# Patient Record
Sex: FEMALE | Race: WHITE
[De-identification: names, ages, dates, MRNs, and addresses within clinical notes are randomized per-mention and may not be internally consistent; named-entity substitution may affect disease eponyms.]

---

## 2017-01-04 ENCOUNTER — HOSPITAL ENCOUNTER (OUTPATIENT)
Dept: HOSPITAL 45 - C.CTS | Age: 67
Discharge: HOME | End: 2017-01-04
Attending: PSYCHIATRY & NEUROLOGY
Payer: COMMERCIAL
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
## 2017-01-04 DIAGNOSIS — R51: Primary | ICD-10-CM

## 2017-01-04 DIAGNOSIS — S09.90XA: ICD-10-CM

## 2017-01-04 LAB
ALBUMIN/GLOB SERPL: 1.1 {RATIO} (ref 0.9–2)
ALP SERPL-CCNC: 73 U/L (ref 45–117)
ALT SERPL-CCNC: 18 U/L (ref 12–78)
ANION GAP SERPL CALC-SCNC: 9 MMOL/L (ref 3–11)
AST SERPL-CCNC: 14 U/L (ref 15–37)
BASOPHILS # BLD: 0.09 K/UL (ref 0–0.2)
BASOPHILS NFR BLD: 1 %
BUN SERPL-MCNC: 14 MG/DL (ref 7–18)
BUN/CREAT SERPL: 14.8 (ref 10–20)
CALCIUM SERPL-MCNC: 9.1 MG/DL (ref 8.5–10.1)
CHLORIDE SERPL-SCNC: 106 MMOL/L (ref 98–107)
CO2 SERPL-SCNC: 26 MMOL/L (ref 21–32)
COMPLETE: YES
CREAT SERPL-MCNC: 0.93 MG/DL (ref 0.6–1.2)
EOSINOPHIL NFR BLD AUTO: 301 K/UL (ref 130–400)
GLOBULIN SER-MCNC: 3.5 GM/DL (ref 2.5–4)
GLUCOSE SERPL-MCNC: 76 MG/DL (ref 70–99)
HCT VFR BLD CALC: 41.7 % (ref 37–47)
IG%: 0.1 %
IMM GRANULOCYTES NFR BLD AUTO: 27 %
LYMPHOCYTES # BLD: 2.45 K/UL (ref 1.2–3.4)
MCH RBC QN AUTO: 29.7 PG (ref 25–34)
MCHC RBC AUTO-ENTMCNC: 32.9 G/DL (ref 32–36)
MCV RBC AUTO: 90.3 FL (ref 80–100)
MONOCYTES NFR BLD: 6.4 %
NEUTROPHILS # BLD AUTO: 2 %
NEUTROPHILS NFR BLD AUTO: 63.5 %
PMV BLD AUTO: 11.6 FL (ref 7.4–10.4)
POTASSIUM SERPL-SCNC: 4.1 MMOL/L (ref 3.5–5.1)
RBC # BLD AUTO: 4.62 M/UL (ref 4.2–5.4)
SODIUM SERPL-SCNC: 141 MMOL/L (ref 136–145)
TSH SERPL-ACNC: 1.48 UIU/ML (ref 0.3–4.5)
WBC # BLD AUTO: 9.07 K/UL (ref 4.8–10.8)

## 2017-01-04 NOTE — DIAGNOSTIC IMAGING REPORT
CT HEAD WITHOUT CONTRAST (CT)



CLINICAL HISTORY: Headache. Head trauma. Dizziness.    



COMPARISON STUDY:  MRI the brain dated 7/6/2016



TECHNIQUE:  Axial CT of the brain is performed from the vertex to the skull

base. IV contrast was not administered for this examination.



CT DOSE: 614.27 mGy.cm



FINDINGS:



No intra or extra-axial mass lesions are visualized. There is no CT evidence of

acute cortical infarction. There is no evidence of midline shift. There is no

acute  hemorrhage. No calvarial fractures are visualized. 

There are patchy white matter hypodensities likely on a small vessel basis.

There is no evidence of pathologic ventricular dilatation.

There is no evidence of acute sinusitis



IMPRESSION: No acute intracranial findings







Electronically signed by:  Jone Recinos M.D.

1/4/2017 11:00 AM

## 2017-03-31 ENCOUNTER — HOSPITAL ENCOUNTER (OUTPATIENT)
Dept: HOSPITAL 45 - C.MRIBC | Age: 67
Discharge: HOME | End: 2017-03-31
Attending: PHYSICIAN ASSISTANT
Payer: COMMERCIAL

## 2017-03-31 DIAGNOSIS — M99.73: ICD-10-CM

## 2017-03-31 DIAGNOSIS — Z98.1: ICD-10-CM

## 2017-03-31 DIAGNOSIS — M54.16: Primary | ICD-10-CM

## 2017-03-31 NOTE — DIAGNOSTIC IMAGING REPORT
LUMBAR SPINE MRI



HISTORY: Pain. Radiculopathy.  LUMBAR RADICULOPATHY



TECHNIQUE: Multiplanar multisequence MRI of the lumbar spine was performed

without the use of contrast.



COMPARISON:  None.



FINDINGS: For the purpose of the report the L5-S1 disc space will be located on

axial image 27 of 30.

There are findings of a posterior laminectomy and fusion from L3 through L5.

Interpedicular screws are present.



There is moderate reactive bone marrow edema of the L2-L3 level. This appears to

be on a degenerative basis.



L1-L2: No significant central canal or neural foraminal narrowing.



L2-L3: Moderate multifactorial narrowing of the spinal canal. Moderate

broad-based disc herniation. Hypertrophic changes of posterior elements.

Moderate narrowing of the left and to lesser extent right neural foramina.



L3-L4: Findings of a posterior laminectomy and fusion. No major compromise of

the spinal canal.



L4-L5: Posterior laminectomy and fusion. No compromise of the spinal canal. Mild

broad-based disc bulge showing only minimal impact anterior thecal sac. Mild

narrowing of the neuroforamina bilaterally.



L5-S1: Posterior laminectomy and fusion. Mild central disc bulge showing no

significant impact her deformity with the thecal sac.



IMPRESSION:  



1. Moderate multifactorial spinal stenosis at L2-L3.

2. Posterior laminectomy and fusion from L3 through L5.

3. Considerable degenerative disc change L2-L3 







Electronically signed by:  Tay Vasquez M.D.

3/31/2017 2:35 PM



Dictated Date/Time:  3/31/2017 2:29 PM

## 2017-07-07 ENCOUNTER — HOSPITAL ENCOUNTER (OUTPATIENT)
Dept: HOSPITAL 45 - C.LABBFT | Age: 67
Discharge: HOME | End: 2017-07-07
Attending: PHYSICIAN ASSISTANT
Payer: COMMERCIAL

## 2017-07-07 DIAGNOSIS — R27.0: ICD-10-CM

## 2017-07-07 DIAGNOSIS — Z51.81: Primary | ICD-10-CM

## 2017-07-07 DIAGNOSIS — Z79.899: ICD-10-CM

## 2017-07-07 LAB
ALBUMIN/GLOB SERPL: 1.2 {RATIO} (ref 0.9–2)
ALP SERPL-CCNC: 68 U/L (ref 45–117)
ALT SERPL-CCNC: 17 U/L (ref 12–78)
ANION GAP SERPL CALC-SCNC: 6 MMOL/L (ref 3–11)
AST SERPL-CCNC: 13 U/L (ref 15–37)
BASOPHILS # BLD: 0.04 K/UL (ref 0–0.2)
BASOPHILS NFR BLD: 0.7 %
BUN SERPL-MCNC: 10 MG/DL (ref 7–18)
BUN/CREAT SERPL: 10.6 (ref 10–20)
CALCIUM SERPL-MCNC: 9.3 MG/DL (ref 8.5–10.1)
CHLORIDE SERPL-SCNC: 111 MMOL/L (ref 98–107)
CHOLEST/HDLC SERPL: 3.1 {RATIO}
CO2 SERPL-SCNC: 25 MMOL/L (ref 21–32)
COMPLETE: YES
CREAT SERPL-MCNC: 0.91 MG/DL (ref 0.6–1.2)
EOSINOPHIL NFR BLD AUTO: 219 K/UL (ref 130–400)
GLOBULIN SER-MCNC: 3.1 GM/DL (ref 2.5–4)
GLUCOSE SERPL-MCNC: 81 MG/DL (ref 70–99)
GLUCOSE UR QL: 75 MG/DL
HCT VFR BLD CALC: 41.6 % (ref 37–47)
IG%: 0 %
IMM GRANULOCYTES NFR BLD AUTO: 30.9 %
KETONES UR QL STRIP: 140 MG/DL
LYME DISEASE AB IGG: (no result)
LYME DISEASE AB IGM: (no result)
LYMPHOCYTES # BLD: 1.77 K/UL (ref 1.2–3.4)
MCH RBC QN AUTO: 30.9 PG (ref 25–34)
MCHC RBC AUTO-ENTMCNC: 33.2 G/DL (ref 32–36)
MCV RBC AUTO: 93.3 FL (ref 80–100)
MONOCYTES NFR BLD: 7.5 %
NEUTROPHILS # BLD AUTO: 1.7 %
NEUTROPHILS NFR BLD AUTO: 59.2 %
NITRITE UR QL STRIP: 74 MG/DL (ref 0–150)
PH UR: 230 MG/DL (ref 0–200)
PMV BLD AUTO: 12.6 FL (ref 7.4–10.4)
POTASSIUM SERPL-SCNC: 4.2 MMOL/L (ref 3.5–5.1)
RBC # BLD AUTO: 4.46 M/UL (ref 4.2–5.4)
SODIUM SERPL-SCNC: 142 MMOL/L (ref 136–145)
TSH SERPL-ACNC: 0.44 UIU/ML (ref 0.3–4.5)
VERY LOW DENSITY LIPOPROT CALC: 15 MG/DL
WBC # BLD AUTO: 5.72 K/UL (ref 4.8–10.8)

## 2017-07-11 ENCOUNTER — HOSPITAL ENCOUNTER (OUTPATIENT)
Dept: HOSPITAL 45 - C.MRIBC | Age: 67
Discharge: HOME | End: 2017-07-11
Attending: PHYSICIAN ASSISTANT
Payer: COMMERCIAL

## 2017-07-11 DIAGNOSIS — R27.0: ICD-10-CM

## 2017-07-11 DIAGNOSIS — R42: Primary | ICD-10-CM

## 2017-07-11 NOTE — DIAGNOSTIC IMAGING REPORT
BRAIN COMBO FOR IAC



HISTORY:66 [id number] Ataxia 



COMPARISON: Head CT 1/4/2017, 4/23/2012.



TECHNIQUE: Multiplanar multisequence MRI of the brain was obtained both with and

without the use of 5 mL Gadavist utilizing internal auditory canal protocol.



FINDINGS: 

Increased signal within the atria of the lateral ventricles bilaterally on the

diffusion sequence is not to be artifactual in nature. No restricted diffusion

within the brain parenchyma to suggest acute ischemia. No areas of decreased

signal on the ADC map. The midline structures including the corpus callosum,

brainstem, optic chiasm, pituitary gland and pineal gland appear unremarkable on

the sagittal T1 series. No cerebellar tonsillar herniation. There is mild

uncovertebral spurring of the imaged cervical spine.



Scattered areas of T2 prolongation are seen within the periventricular and

subcortical white matter of the cerebral hemispheres bilaterally. There is mild

cerebral atrophy. No acute intracranial hemorrhage, midline shift, abnormal

extra-axial collections or intracranial mass. The flow voids at the level of the

skull base appear patent. Orbits are symmetric. The mastoid air cells and middle

ear cavities are clear. There is mild ethmoid sinus disease.



The courses of the 7th and 8th cranial nerves appear normal without abnormal

enhancement. There is no cerebellar pontine angle mass identified.



IMPRESSION: 

1. No acute intracranial abnormality.

2. Course of the 7th and 8th cranial nerves appear normal without abnormal

enhancement or mass.

3. Mild cerebral atrophy with findings suggesting chronic microvascular ischemic

changes. 







The above report was generated using voice recognition software. It may contain

grammatical, syntax or spelling errors.







Electronically signed by:  Nasir Bean M.D.

7/11/2017 2:51 PM



Dictated Date/Time:  7/11/2017 2:36 PM

## 2017-10-02 ENCOUNTER — HOSPITAL ENCOUNTER (OUTPATIENT)
Dept: HOSPITAL 45 - X.SURG | Age: 67
Discharge: HOME | End: 2017-10-02
Attending: OPHTHALMOLOGY
Payer: COMMERCIAL

## 2017-10-02 VITALS
BODY MASS INDEX: 22.62 KG/M2 | WEIGHT: 115.24 LBS | WEIGHT: 115.24 LBS | BODY MASS INDEX: 22.62 KG/M2 | HEIGHT: 60 IN | HEIGHT: 60 IN

## 2017-10-02 VITALS — TEMPERATURE: 97.16 F

## 2017-10-02 VITALS — DIASTOLIC BLOOD PRESSURE: 73 MMHG | HEART RATE: 59 BPM | SYSTOLIC BLOOD PRESSURE: 121 MMHG | OXYGEN SATURATION: 94 %

## 2017-10-02 DIAGNOSIS — F31.9: ICD-10-CM

## 2017-10-02 DIAGNOSIS — Z88.1: ICD-10-CM

## 2017-10-02 DIAGNOSIS — F41.9: ICD-10-CM

## 2017-10-02 DIAGNOSIS — H26.9: Primary | ICD-10-CM

## 2017-10-02 DIAGNOSIS — G47.33: ICD-10-CM

## 2017-10-02 DIAGNOSIS — Z96.641: ICD-10-CM

## 2017-10-02 RX ADMIN — TROPICAMIDE SCH DROP: 10 SOLUTION/ DROPS OPHTHALMIC at 09:48

## 2017-10-02 RX ADMIN — KETOROLAC TROMETHAMINE SCH DROP: 5 SOLUTION OPHTHALMIC at 09:50

## 2017-10-02 RX ADMIN — PHENYLEPHRINE HYDROCHLORIDE SCH DROP: 25 SOLUTION/ DROPS OPHTHALMIC at 09:41

## 2017-10-02 RX ADMIN — MOXIFLOXACIN HYDROCHLORIDE SCH DROP: 5 SOLUTION/ DROPS OPHTHALMIC at 09:59

## 2017-10-02 RX ADMIN — PHENYLEPHRINE HYDROCHLORIDE SCH DROP: 25 SOLUTION/ DROPS OPHTHALMIC at 09:46

## 2017-10-02 RX ADMIN — MOXIFLOXACIN HYDROCHLORIDE SCH DROP: 5 SOLUTION/ DROPS OPHTHALMIC at 09:45

## 2017-10-02 RX ADMIN — TROPICAMIDE SCH DROP: 10 SOLUTION/ DROPS OPHTHALMIC at 09:42

## 2017-10-02 RX ADMIN — CYCLOPENTOLATE HYDROCHLORIDE SCH DROP: 10 SOLUTION/ DROPS OPHTHALMIC at 09:49

## 2017-10-02 RX ADMIN — KETOROLAC TROMETHAMINE SCH DROP: 5 SOLUTION OPHTHALMIC at 09:44

## 2017-10-02 RX ADMIN — CYCLOPENTOLATE HYDROCHLORIDE SCH DROP: 10 SOLUTION/ DROPS OPHTHALMIC at 09:43

## 2017-10-02 NOTE — [facility] OPERATIVE REPORT
Operative Report


Operative Date


Oct 2, 2017.





Pre-Operative Diagnosis





Right Eye Cataract





Post-Operative Diagnosis





Same





Procedure(s) Performed





Right Cataract Phacoemulsification With Intraocular Lens Implant





Surgeon


Dr Goldstein





Assistant Surgeon(s)


None





Estimated Blood Loss


0ml





Findings


cataract right eye





Fluids (cc crystalloids)


see anesthesia record





Specimens





None





Drains


none





Anesthesia


local with sedation





Complication(s)


None





Disposition


Recovery Room / PACU





Implants


mx60 21.5





Indications


decreased vision right eye





Description of Procedure


After informed consent was obtained in the holding area the patient was


wheeled back to the operating room where cardiac monitoring leads and oxygen


by nasal cannula was administered by Anesthesia. Gentle IV sedation was


given, and the patient's right eye was prepped and draped in usual sterile


fashion. A wire lid speculum was placed into the right eye and the operating


microscope was swung into position. Using 0.12 forceps and a Supersharp blade


a paracentesis port was made 2 o'clock hours away from the 9 o'clock position


of the patient's right eye. 1% non-preserved Lidocaine was then injected into


the anterior chamber for anesthesia.  A 2.0 mm keratotome blade was then used


to make a shelved clear corneal incision at the 9 o'clock position of the


right eye. Amvisc was injected into the anterior chamber and a cystotome and


Utrata forceps were used to perform a curvilinear capsulorrhexis. BSS on a


hydrodissection cannula was used to hydrodissect the lens nucleus away from


the capsular bag. The phacoemulsification handpiece was then used in a stop


and chop fashion to remove the lens nucleus. The irrigation and aspiration


handpiece was then used to remove the residual cortical material. Amvisc was


injected into the capsular bag and anterior chamber and a [employer] MX60


21.5 Diopter intraocular lens was injected into the capsular bag.  Irrigation


and aspiration handpiece was used to remove the residual viscoelastic


material. The wounds were hydrated and noted to be watertight.  The wire lid


speculum was removed from the eye.  Vigamox, Brimonidine, and TobraDex


ointment were placed on the eye and it was shielded.  It should be noted that


EndoCoat was used extensively during the case to protect the cornea endothelium.


 


DISPOSITION:  The patient tolerated the procedure well and was wheeled to the


post anesthesia care unit in stable condition.


I attest to the content of the Intraoperative Record and any orders documented 

therein.  Any exceptions are noted below.


I attest to the content of the Intraoperative Record and any orders documented 

therein.  Any exceptions are noted below.

## 2017-10-02 NOTE — ANESTHESIA PROGRESS NT - [facility]
Anesthesia Post Op Note


Date & Time


Oct 2, 2017 at 10:59





Vital Signs


Pain Intensity:  0





Vital Signs Past 12 Hours








  Date Time  Temp Pulse Resp B/P (MAP) Pulse Ox O2 Delivery O2 Flow Rate FiO2


 


10/2/17 10:34 36.2 49 16 129/74 (92) 95 Room Air  


 


10/2/17 09:30 36.6 53 16 138/86 (103) 96 Room Air  











Notes


Mental Status:  alert / awake / arousable, participated in evaluation


Pt Amnestic to Procedure:  Yes


Nausea / Vomiting:  adequately controlled


Pain:  adequately controlled


Airway Patency, RR, SpO2:  stable & adequate


BP & HR:  stable & adequate


Hydration State:  stable & adequate


Anesthetic Complications:  no major complications apparent

## 2017-10-02 NOTE — DISCHARGE INSTRUCTIONS-[facility]
Discharge Instructions


Date of Service


Oct 2, 2017.





Visit


Reason for Visit:  Right Cataract





Discharge


Discharge Diagnosis / Problem:  cataract right eye





Discharge Goals


Goal(s):  Improve function





Activity Recommendations


Activity Limitations:  per Instructions/Follow-up section


Lifting Limitations:  no more than 5 pounds





Anesthesia


.





Post Anesthesia Instructions:





If you have had General Anesthesia or IV Sedation:





*  Do not drive today.


*  Resume driving when surgeon permits.


*  Do not make important decisions or sign legal documents today.


*  Call surgeon for:





   1.  Temperature elevations greater than 101 degrees F.


   2.  Uncontrollable pain.


   3.  Excessive bleeding.


   4.  Persistent nausea and vomiting.


   5.  Medication intolerance (nausea, vomiting or rash).





*  For nausea and vomiting use only clear liquids such as: tea, soda, bouillon 

until nausea subsides, then gradually increase diet as tolerated.





*  If you have any concerns or questions, call your surgeon's office.  If 

physician is unavailable and it is an emergency, call 911 or go to the nearest 

emergency room.





.





Instructions / Follow-Up


Instructions / Follow-Up





ACTIVITY RECOMMENDATIONS:





*  Light activities





*  You may walk outside, read, watch television.





*  Mild irritation and blurred vision are common for the first few days, 

redness around the white part of the eye is common.








MEDICATIONS:





Resume previous medications unless instructed otherwise by your surgeon.





Eye drops (today and tomorrow):


   


   Cipro - one drop in operative eye every 2 hours while awake


   Prednisolone 1% - one drop in operative eye every 2 hours while awake


   Bromfenac - one drop in operative eye once daily


   





SPECIAL CARE INSTRUCTIONS:





*  If any problems or concerns, please call Dr. Goldstein's office at [phone].





*  Keep plastic shield taped over eye to sleep at night.





*  Keep plastic shield taped over eye except to administer eye drops.





*  Keep plastic shield on until office visit the following day.








FOLLOW UP VISIT:





Follow-up with Dr. Goldstein in the [place] office as scheduled.





If not already scheduled, please call the office at [phone].





Diet Recommendations


Home Diet:  resume previous diet





Procedures


Procedures Performed:  


Right Cataract Phacoemulsification With Intraocular Lens Implant





Pending Studies


Studies pending at discharge:  no





Medical Emergencies








.


Who to Call and When:





Medical Emergencies:  If at any time you feel your situation is an emergency, 

please call 911 immediately.





.





Non-Emergent Contact


Non-Emergency issues call your:  Ophthalmologist





.


.








"Provider Documentation" section prepared by Lui Goldstein.








.

## 2017-12-01 ENCOUNTER — HOSPITAL ENCOUNTER (EMERGENCY)
Dept: HOSPITAL 45 - C.EDB | Age: 67
Discharge: HOME | End: 2017-12-01
Payer: COMMERCIAL

## 2017-12-01 VITALS — TEMPERATURE: 97.7 F

## 2017-12-01 VITALS — SYSTOLIC BLOOD PRESSURE: 116 MMHG | DIASTOLIC BLOOD PRESSURE: 92 MMHG | OXYGEN SATURATION: 95 % | HEART RATE: 51 BPM

## 2017-12-01 VITALS
HEIGHT: 60 IN | BODY MASS INDEX: 23.37 KG/M2 | BODY MASS INDEX: 23.37 KG/M2 | WEIGHT: 119.05 LBS | HEIGHT: 60 IN | WEIGHT: 119.05 LBS

## 2017-12-01 DIAGNOSIS — F07.81: ICD-10-CM

## 2017-12-01 DIAGNOSIS — I10: ICD-10-CM

## 2017-12-01 DIAGNOSIS — F32.9: ICD-10-CM

## 2017-12-01 DIAGNOSIS — Z83.3: ICD-10-CM

## 2017-12-01 DIAGNOSIS — Z79.82: ICD-10-CM

## 2017-12-01 DIAGNOSIS — S42.261A: Primary | ICD-10-CM

## 2017-12-01 DIAGNOSIS — Z79.899: ICD-10-CM
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
## 2017-12-01 DIAGNOSIS — M81.0: ICD-10-CM

## 2017-12-01 DIAGNOSIS — Z90.710: ICD-10-CM

## 2017-12-01 DIAGNOSIS — Z80.3: ICD-10-CM

## 2017-12-01 DIAGNOSIS — E03.9: ICD-10-CM

## 2017-12-01 DIAGNOSIS — F17.210: ICD-10-CM

## 2017-12-01 DIAGNOSIS — G62.9: ICD-10-CM

## 2017-12-01 DIAGNOSIS — E78.5: ICD-10-CM

## 2017-12-01 DIAGNOSIS — Z80.0: ICD-10-CM

## 2017-12-01 DIAGNOSIS — Z87.442: ICD-10-CM

## 2017-12-01 NOTE — DIAGNOSTIC IMAGING REPORT
R SHOULDER MIN 2 VIEWS ROUTINE



CLINICAL HISTORY: fall trauma



COMPARISON: None.



DISCUSSION: Moderate generalized degenerative change. Probable cortical fracture

posterior lateral aspect lateral humeral head. Moderate degenerative change

acromioclavicular joint.



IMPRESSION: Probable nondisplaced cortical fracture lateral humeral head.

Moderate generalized degenerative change. No evidence of dislocation. Potential

cortical fracture posterior right fourth rib, uncertain age.











The above report was generated using voice recognition software.  It may contain

grammatical, syntax or spelling errors.







Electronically signed by:  Tay Vasquez M.D.

12/1/2017 12:04 PM



Dictated Date/Time:  12/1/2017 12:02 PM

## 2017-12-01 NOTE — EMERGENCY ROOM VISIT NOTE
History


Report prepared by Susi:  Mikayla Bolaños


Under the Supervision of:  Dr. Anurag Nina M.D.


First contact with patient:  10:05


Chief Complaint:  ARM PAIN


Stated Complaint:  SEVERE PAIN IN RIGHT ARM DUE TO FALL





History of Present Illness


The patient is a 67 year old female who presents to the Emergency Room with 

complaints of constant right arm pain beginning after a fall two days ago. She 

did not hit her head or lose consciousness at this time. She rates her current 

pain as a 6/10, but worsened to a 10/10 with movement. The patient also 

complains of nausea, and back pain. She states that she has been falling 

frequently recently which is thought to be related to peripheral neuropathy. 

She is currently being treated for post-concussion syndrome following one of 

her falls three weeks ago for which she was evaluated in the ED. The patient 

denies chest pain, SOB, loss of continence, fevers, or vomiting.





   Source of History:  patient


   Onset:  Two days ago


   Position:  arm (right)


   Symptom Intensity:  6/10, worsens to 10/10 with movement


   Timing:  constant


   Modifying Factors (Worsening):  movement


   Associated Symptoms:  No fevers, No chest pain, No SOB, No vomiting


Note:


The patient denies loss of continence.





Review of Systems


All systems have been listed, reviewed, and are negative other than those 

previously mentioned. Please see Additional Medical History Sheet.





Past Medical & Surgical


Medical Problems:


(1) Benign hypertension


(2) Deep venous thrombosis


(3) Depression


(4) Dyslipidemia


(5) History of calculus of kidney


(6) Hypothyroidism


(7) Osteoporosis


(8) Partial seizure


(9) Recurrent falls


(10) s/p appendectomy


(11) s/p hysterectomy


(12) s/p lumbar spine surgery


(13) s/p uvulectomy








Family History





Diabetes mellitus


FH: breast cancer


FH: colon cancer





Social History


Smoking Status:  Current Every Day Smoker


Alcohol Use:  none, other


Marital Status:  


Housing Status:  lives alone





Current/Historical Medications


Scheduled


Alendronate/Cholecalciferol (Fosamax+D 70MG/2800 Iu), 1 TABLET PO WK


Apoaequorin (Prevagen), 1 CAP PO BID


Aspirin (Aspirin Ec), 81 MG PO 1400


Calcium/Vitamin D (Os-Janes 500 Plus D), 2 TAB PO 1400


Cyanocobalamin (Vitamin B-12 1000 Mcg), 1,000 MCG PO QAM


Divalproex Sodium (Depakote Delay Rel), 250 MG PO HS


Docusate Sodium (Colace), 1 CAP PO QPM


Fluticasone Prop/Salmeterol (Advair Diskus 250/50 60 Dose), 1 PUFF INH BID


Lamotrigine (Lamictal), 2 TABS PO BID


Methylphenidate (Ritalin), 10 MG PO QPM


Methylphenidate (Ritalin), 20 MG PO QAM


Nitroglycerin (Nitrostat), 0.3 MG UT PRN


Omega 3 Fatty Acids-Lutein-Thuy ([facility]), 1 CAP PO BID


Pregabalin (Lyrica), 2 TABS PO BID


Ranitidine (Zantac), 150 MG PO TID


Trazodone Hcl (Trazodone), 100 MG PO HS


Verapamil Hcl (Calan Sr Ext Rel), 180 MG PO QAM


[Imitrex], 1 TAB PO UD





Scheduled PRN


Albuterol Sulfate (Proair Respiclick), 2 PUFFS INH Q4H PRN for SOB/Wheezing


Meclizine Hcl (Meclizine Hcl), 1 TAB PO TID PRN for Dizziness or Vertigo


Oxycodone/Acetaminophen 5MG/325MG (Percocet 5MG/325MG), 1-2 TABLETS PO Q4H PRN 

for Pain





Allergies


Coded Allergies:  


     Oyster (Verified  Allergy, Mild, N/V, 12/1/17)


     Scallop (Verified  Allergy, Mild, N/V, 12/1/17)


     Sulfamethoxazole w/Trimethoprim (Verified  Adverse Reaction, Mild, N/V, 10/

2/17)





Physical Exam


Vital Signs











  Date Time  Temp Pulse Resp B/P (MAP) Pulse Ox O2 Delivery O2 Flow Rate FiO2


 


12/1/17 15:43  51 16 116/92 95 Room Air  


 


12/1/17 14:43  58 18 116/62 95 Room Air  


 


12/1/17 13:47  50 14 112/76 92 Room Air  


 


12/1/17 11:27  53 15 119/74 91 Room Air  


 


12/1/17 09:50 36.5 68 18 122/71 96 Room Air  











Physical Exam


GENERAL: Patient awake, alert, oriented x 3.  Patient follows commands.  

Patient does not appear toxic.  Patient is adequately hydrated and well-

nourished.  


SKIN: No erythema, pallor, cyanosis or rash


HEENT: Normal head, pupils equal, reactive to light and accommodation.


LUNGS: Clear to auscultation.  No wheezes, no rales, no rhonchi.


HEART:  No murmurs. No gallops. No rubs


ABDOMEN: No masses, no rebound, no hepatomegaly or splenomegaly.


EXTREMITIES: No signs of trauma.  No pedal or pretibial edema.  No calf or 

thigh tenderness. No tenderness to palpation of the right clavicle. Tenderness 

to palpation of the right humeral head. Limited abduction, flexion and 

extension. No significant tenderness over the AC joint. 


NEUROLOGIC: Cranial nerves II-XII within normal limits.  No gross motor sensory 

function deficits.





Medical Decision & Procedures


ER Provider


Diagnostic Interpretation:


Radiology results as stated below per my review and radiologist interpretation:





R SHOULDER MIN 2 VIEWS ROUTINE





DISCUSSION: Moderate generalized degenerative change. Probable cortical fracture


posterior lateral aspect lateral humeral head. Moderate degenerative change


acromioclavicular joint.





IMPRESSION: Probable nondisplaced cortical fracture lateral humeral head.


Moderate generalized degenerative change. No evidence of dislocation. Potential


cortical fracture posterior right fourth rib, uncertain age.





The above report was generated using voice recognition software.  It may contain


grammatical, syntax or spelling errors.





Electronically signed by:  Tay Vasquez M.D.


12/1/2017 12:04 PM








R UPPER EXTREMITY WITHOUT





FINDINGS: 


There is an acute comminuted fracture of the medial aspect of the lesser


tuberosity with extension into the intertubercular groove as seen on images 73


through 94 of series 2 and image 20 of series 200. There is approximately 2 mm


inferior and medial displacement of the fracture fragments. The greater


tuberosity, glenoid, surgical neck and humeral head appear intact. Mild to


moderate glenohumeral and mild acromial clavicular osteoarthritis with large


subcortical cystic change of the posterior glenoid. Degenerative spurring is


noted about the elbow. No additional acute fracture or dislocation identified.





There is mild soft tissue swelling about the proximal humeral shaft, likely


reactive.





IMPRESSION: 


1. Acute comminuted minimally displaced fracture of the lesser tuberosity


extending into the intertubercular groove.


2. Mild to moderate glenohumeral and mild acromioclavicular osteoarthritis.





The above report was generated using voice recognition software. It may contain


grammatical, syntax or spelling errors.





Electronically signed by:  Nasir Bean M.D.


12/1/2017 1:54 PM





Medications Administered











 Medications


  (Trade)  Dose


 Ordered  Sig/Kennedy


 Route  Start Time


 Stop Time Status Last Admin


Dose Admin


 


 Oxycodone/


 Acetaminophen


  (Percocet


 5-325mg Tab)  1 tab  NOW  STAT


 PO  12/1/17 11:18


 12/1/17 11:19 DC 12/1/17 11:29


1 TAB











ED Course


1005: Past medical records reviewed. The patient was evaluated in room C2. A 

complete history and physical examination was performed. 





1118: Ordered Percocet 5-325 mg Tab PO.





Medical Decision


Nurses notes reviewed. Medical history sheet reviewed. Differential diagnosis 

includes but is not limited to: rotator cuff tear, AC sprain, clavicle sprain, 

clavicle fracture, arthritis, bursitis, and dislocation.





Initial x-ray of her shoulder reveals a questionable fracture therefore a CT 

was obtained.  The patient does have comminuted fracture near the lesser 

tuberosity.  The patient was fitted with a sling and swath.  She is given a 

prescription for Percocet.  I briefly discussed care with the orthopedist on 

call.  The patient will need follow-up.





Medication Reconcilliation


Current Medication List:  was personally reviewed by me





Blood Pressure Screening


Patient's blood pressure:  Normal blood pressure


Blood pressure disposition:  Did not require urgent referral





Consults


Time Called:  1555


Consulting Physician:  Gavin


Returned Call:  [phone]


He will follow the patient in his office.  In the meantime, the patient will be 

placed in a sling-and-swathe.





Impression





 Primary Impression:  


 Humeral head fracture





Scribe Attestation


The scribe's documentation has been prepared under my direction and personally 

reviewed by me in its entirety. I confirm that the note above accurately 

reflects all work, treatment, procedures, and medical decision making performed 

by me.





Departure Information


Dispostion


Home / Self-Care





Prescriptions





Oxycodone/Acetaminophen 5MG/325MG (PERCOCET 5MG/325MG)  Tab


1-2 TABLETS PO Q4H Y for Pain, #20 TAB


   Prov: Anurag Nina M.D.         12/1/17





Referrals


Tay Chávez M.D. (PCP)





Forms


HOME CARE DOCUMENTATION FORM,                                                 

               IMPORTANT VISIT INFORMATION





Patient Instructions


My [facility]





Additional Instructions





1-2 Percocet every 4 hours as needed for moderate to severe pain.


Be very cautious with use of Percocet as it may cause increased sedation and 

unsteadiness on your feet.


Continue Colace to prevent constipation.


Follow-up with your orthopedist next week.


Keep the sling on as much as possible until you see the orthopedist.

## 2017-12-01 NOTE — DIAGNOSTIC IMAGING REPORT
R UPPER EXTREMITY WITHOUT



HISTORY:  67 years-old Female proximal right humerus acute fall with severe

right arm pain.



COMPARISON: Right humerus radiographs of same day



TECHNIQUE: Multiple axial CT images of the right upper extremity were obtained

without contrast. A dose lowering technique was used consistent with the

principals of MELY. Additional 3-D rendered images were generated from a

separate workstation.



FINDINGS: 

There is an acute comminuted fracture of the medial aspect of the lesser

tuberosity with extension into the intertubercular groove as seen on images 73

through 94 of series 2 and image 20 of series 200. There is approximately 2 mm

inferior and medial displacement of the fracture fragments. The greater

tuberosity, glenoid, surgical neck and humeral head appear intact. Mild to

moderate glenohumeral and mild acromial clavicular osteoarthritis with large

subcortical cystic change of the posterior glenoid. Degenerative spurring is

noted about the elbow. No additional acute fracture or dislocation identified.



There is mild soft tissue swelling about the proximal humeral shaft, likely

reactive.



IMPRESSION: 

1. Acute comminuted minimally displaced fracture of the lesser tuberosity

extending into the intertubercular groove.

2. Mild to moderate glenohumeral and mild acromioclavicular osteoarthritis.







The above report was generated using voice recognition software. It may contain

grammatical, syntax or spelling errors.







Electronically signed by:  Nasir Bean M.D.

12/1/2017 1:54 PM



Dictated Date/Time:  12/1/2017 1:49 PM

## 2018-07-27 ENCOUNTER — HOSPITAL ENCOUNTER (INPATIENT)
Dept: HOSPITAL 45 - C.EDB | Age: 68
LOS: 10 days | Discharge: NURSING, REHAB OR LONG-TERM CARE | DRG: 183 | End: 2018-08-06
Attending: HOSPITALIST | Admitting: FAMILY MEDICINE
Payer: COMMERCIAL

## 2018-07-27 VITALS
WEIGHT: 129.41 LBS | BODY MASS INDEX: 22.09 KG/M2 | HEIGHT: 64 IN | HEIGHT: 64 IN | WEIGHT: 129.41 LBS | BODY MASS INDEX: 22.09 KG/M2 | BODY MASS INDEX: 22.09 KG/M2

## 2018-07-27 VITALS
HEART RATE: 49 BPM | TEMPERATURE: 96.62 F | SYSTOLIC BLOOD PRESSURE: 147 MMHG | OXYGEN SATURATION: 96 % | DIASTOLIC BLOOD PRESSURE: 83 MMHG

## 2018-07-27 VITALS
TEMPERATURE: 96.62 F | SYSTOLIC BLOOD PRESSURE: 147 MMHG | OXYGEN SATURATION: 96 % | DIASTOLIC BLOOD PRESSURE: 83 MMHG | HEART RATE: 49 BPM

## 2018-07-27 VITALS
HEART RATE: 50 BPM | SYSTOLIC BLOOD PRESSURE: 121 MMHG | OXYGEN SATURATION: 95 % | TEMPERATURE: 96.8 F | DIASTOLIC BLOOD PRESSURE: 81 MMHG

## 2018-07-27 DIAGNOSIS — F41.8: ICD-10-CM

## 2018-07-27 DIAGNOSIS — Z96.641: ICD-10-CM

## 2018-07-27 DIAGNOSIS — I10: ICD-10-CM
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
## 2018-07-27 DIAGNOSIS — Z88.2: ICD-10-CM

## 2018-07-27 DIAGNOSIS — G62.9: ICD-10-CM
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
## 2018-07-27 DIAGNOSIS — F10.11: ICD-10-CM

## 2018-07-27 DIAGNOSIS — E78.5: ICD-10-CM

## 2018-07-27 DIAGNOSIS — T40.2X5A: ICD-10-CM

## 2018-07-27 DIAGNOSIS — J43.9: ICD-10-CM
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
## 2018-07-27 DIAGNOSIS — E03.9: ICD-10-CM

## 2018-07-27 DIAGNOSIS — M25.512: ICD-10-CM

## 2018-07-27 DIAGNOSIS — J98.11: ICD-10-CM

## 2018-07-27 DIAGNOSIS — F31.9: ICD-10-CM
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
## 2018-07-27 DIAGNOSIS — I44.7: ICD-10-CM

## 2018-07-27 DIAGNOSIS — G92: ICD-10-CM

## 2018-07-27 DIAGNOSIS — T84.226A: ICD-10-CM

## 2018-07-27 DIAGNOSIS — R29.6: ICD-10-CM

## 2018-07-27 DIAGNOSIS — S22.42XA: Primary | ICD-10-CM

## 2018-07-27 DIAGNOSIS — F17.210: ICD-10-CM

## 2018-07-27 LAB
BUN SERPL-MCNC: 17 MG/DL (ref 7–18)
CA-I BLD-SCNC: 1.14 MMOL/L (ref 1.12–1.32)
CALCIUM SERPL-MCNC: 8.4 MG/DL (ref 8.5–10.1)
CO2 SERPL-SCNC: 26 MMOL/L (ref 21–32)
CREAT BLD-MCNC: 0.9 MG/DL (ref 0.6–1.3)
CREAT SERPL-MCNC: 0.82 MG/DL (ref 0.6–1.2)
GLUCOSE SERPL-MCNC: 82 MG/DL (ref 70–99)
INR PPP: 1 (ref 0.9–1.1)
ISTAT POTASSIUM: 4.1 MEQ/L (ref 3.3–5)
POTASSIUM SERPL-SCNC: 4.1 MMOL/L (ref 3.5–5.1)
SODIUM BLD-SCNC: 140 MEQ/L (ref 135–144)
SODIUM SERPL-SCNC: 141 MMOL/L (ref 136–145)

## 2018-07-27 RX ADMIN — KETOROLAC TROMETHAMINE PRN MG: 15 INJECTION INTRAMUSCULAR; INTRAVENOUS at 20:08

## 2018-07-27 RX ADMIN — PREGABALIN SCH MG: 150 CAPSULE ORAL at 21:18

## 2018-07-27 RX ADMIN — HEPARIN SODIUM SCH UNIT: 10000 INJECTION, SOLUTION INTRAVENOUS; SUBCUTANEOUS at 21:25

## 2018-07-27 RX ADMIN — SODIUM CHLORIDE SCH MLS/HR: 900 INJECTION, SOLUTION INTRAVENOUS at 19:55

## 2018-07-27 RX ADMIN — DIVALPROEX SODIUM SCH MG: 250 TABLET, DELAYED RELEASE ORAL at 21:20

## 2018-07-27 RX ADMIN — DOCUSATE SODIUM SCH MG: 100 CAPSULE, LIQUID FILLED ORAL at 21:20

## 2018-07-27 RX ADMIN — FLUTICASONE PROPIONATE AND SALMETEROL SCH PUFF: 50; 250 POWDER RESPIRATORY (INHALATION) at 21:18

## 2018-07-27 RX ADMIN — TRAZODONE HYDROCHLORIDE SCH MG: 100 TABLET ORAL at 21:19

## 2018-07-27 RX ADMIN — RANITIDINE SCH MG: 150 TABLET ORAL at 21:19

## 2018-07-27 RX ADMIN — MORPHINE SULFATE PRN MG: 4 INJECTION, SOLUTION INTRAMUSCULAR; INTRAVENOUS at 17:19

## 2018-07-27 NOTE — DIAGNOSTIC IMAGING REPORT
CT ABD/PELVIS IV AND ORAL CONT



CLINICAL HISTORY: Left-sided abdominal pain status post trauma    



COMPARISON STUDY:  May 15, 2014



TECHNIQUE: Following the IV administration of 92 mL of Optiray-320, CT scan of

the abdomen and pelvis was performed from the lung bases to the proximal femurs.

Images are reviewed in the axial, sagittal, and coronal planes. IV contrast was

administered without complication.  A dose lowering technique was utilized

adhering to the principles of ALARA.





CT DOSE: 720.71 mGy.cm



FINDINGS:



Lower chest: There are bibasal or dependent airspace opacities, likely

atelectatic. No pneumothorax is visualized.



Liver: The contrast-enhanced liver is normal in size, contour, and attenuation.

There is no intrahepatic biliary ductal dilatation. The hepatic veins and portal

veins are patent.



Gallbladder: There is a small gallbladder polyp versus noncalcified stone



Spleen: Normal in size and attenuation.



Pancreas: Unremarkable.



Adrenal glands: Unremarkable.



Kidneys: There is no evidence of acute renal injury. There is a 9 mm hypodensity

within the upper pole the left kidney likely representing a cyst.



Bowel: There are no transition zones indicate bowel obstruction. There is

moderately extensive colonic diverticulosis. There are no acute peridiverticular

inflammatory changes present. There are no findings to indicate acute

appendicitis. There is no pathologic interloop fluid. No extraluminal gas

collections are visualized.



Peritoneum: There is no intraperitoneal free air or abdominal ascites.



Vasculature: The abdominal aorta is normal in course and caliber.



Adenopathy: None.



Pelvic viscera: The uterus appears surgically absent.



Skeletal structures: There are postsurgical changes of a total right hip

arthroplasty. The acetabular screw extends into the right iliopsoas muscle.

There are postsurgical changes within the lumbar spine with evidence for a

posterior spur fusion with pedicle screw fixation. There is probable loosening

of the right L4 pedicle screw. There are advanced degenerative changes the L3-4

level with discogenic endplate sclerosis and mild retrolisthesis of L3 on L4.

There are fractures of the left sixth seventh eighth ninth and 10th and 11th

ribs.



IMPRESSION:  

1. Acute fractures of the left sixth through 11th ribs.

2. No evidence of pneumothorax

3. Bibasal airspace opacities, likely atelectatic

4. No evidence of solid organ injury within the abdomen or pelvis

5. Extensive diverticulosis. No evidence of acute diverticulitis

6. Total right hip arthroplasty. The acetabular screw extends into the right

iliopsoas muscle

7. Postsurgical changes in the lumbar spine. Probable loosening of the right L4

pedicle screw. Advanced degenerative changes the L3-4 level.







Electronically signed by:  Jone Recinos M.D.

7/27/2018 3:07 PM



Dictated Date/Time:  7/27/2018 2:59 PM

## 2018-07-27 NOTE — DIAGNOSTIC IMAGING REPORT
CHEST ONE VIEW PORTABLE



CLINICAL HISTORY: EVAL L RIB PAIN pain



COMPARISON STUDY:  5/21/2014



FINDINGS: Small parenchymal infiltrate combined with atelectasis left lung base.

Lungs otherwise are clear. Diaphragms are smooth. Very slight blunting left

lateral costophrenic angle.



 



IMPRESSION:  Small parenchymal infiltrate combine with minimal atelectasis left

lung base. 











The above report was generated using voice recognition software.  It may contain

grammatical, syntax or spelling errors.









Electronically signed by:  Tay Vasquez M.D.

7/27/2018 12:56 PM



Dictated Date/Time:  7/27/2018 12:55 PM

## 2018-07-27 NOTE — EMERGENCY ROOM VISIT NOTE
ED Visit Note


First contact with patient:  12:11


CHIEF COMPLAINT: Left rib injury 5 hours ago





HISTORY OF PRESENT ILLNESS: Patient is a 68-year-old female who presents 

emergency department by ambulance for evaluation of left-sided anterior lateral 

rib pain after a fall that occurred around 7:00 this morning.  Patient relates 

that about 5 hours ago, she was putting away some canned goods when she lost 

her balance and fell, landing on her left side onto the cans.  She felt a sharp

, sudden pain in the left ribs and difficulty breathing.  The patient denies 

striking her head or losing consciousness.  She reports that she has a history 

of frequent falls and problems with balance after a head injury, and has been 

evaluated thoroughly for this.  The patient complains of a sharp, stabbing left 

rib pain that is worse with movement or breathing.  She rates her discomfort a 9

/10.  Her daughter presented to her home and was able to help her up onto her 

couch.  The patient later summoned EMS herself when the pain worsened.  She 

received a total of 100 mcg of fentanyl and 4 mg of Zofran en route with little 

relief of her discomfort.  They also started her on oxygen by nasal cannula as 

her O2 sats were low per the patient.  She denies any headache, lightheadedness

, dizziness, neck or back pain.  She denies any abdominal pain, nausea or 

vomiting.  The patient is a smoker.  


  


REVIEW OF SYSTEMS: Review of systems as per HPI.  All other systems reviewed 

were negative.  10 systems reviewed.





PMH: Electronic medical records are reviewed and summarized as above/below.  

See Problem List.





SOCIAL HISTORY:  Patient lives at home by herself.  Smoker..   


 


PHYSICAL EXAM: Vital Signs: Reviewed Nurse's notes.  


GENERAL: Patient is a thin, tearful, uncomfortable appearing 68-year-old female 

who is awake and alert and in moderate distress due to her stated complaint.


HEENT: Head - normocephalic and atraumatic. Pupils are equal, round, and 

reactive to light. Extraocular eye muscles are intact and sclera are anicteric.

  Ears -  bilaterally patent canals with no evidence of hemotympanum.  Nose -  

moist nasal mucosa without evidence of trauma or discharge. Mouth - moist 

buccal mucosa with no trauma to the teeth or signs of malocclusion.


Neck:   The neck is supple and there is no pain to palpation over the posterior 

cervical spine and no obvious step-offs or deformities.  There is no JVD or 

tracheal deviation.


Chest: Patient has some superficial bruising to the inferior left breast, with 

marked tenderness over the anterior lateral chest wall between the mid 

clavicular and the mid axillary line.   There is no obvious crepitus or 

paradoxical chest rise.


Heart: Regular rate, and regular rhythm.


Lungs: Breath sounds diminished, no adventitious sounds appreciated.  


 Abdomen: Bowel sounds are present.  Abdomen is soft, nondistended, slightly 

tender in the left upper quadrant, just off of the costal margin.  There is no 

sign of trauma such as contusions, abrasions or penetrations.  There are no 

palpable pulsatile masses or hepatosplenomegaly.  There is no guarding, rigidity

, or rebound noted.


Extremities: No obvious trauma, deformities, contusions, or edema.  There are 

easily palpable peripheral pulses.


Neuro: The patient is awake and alert and easily able to follow commands.  

Muscle strength is 5 out of 5 in all 4 extremities.  Otherwise, neuro exam is 

unremarkable.


Back:  The entire thoracic, lumbar, and sacral spine were palpated.  No 

discomfort over the thoracic spine and lumbar spine.  There are no obvious step-

offs or deformities noted.  There are no obvious signs of trauma such as 

contusions abrasions penetrations noted to the back.





EMERGENCY DEPARTMENT COURSE: The patient was seen and evaluated as above.  She 

presents the emergency department for evaluation of isolated left rib pain and 

left upper quadrant pain after suffering a fall at home about 5 hours ago.  On 

exam she is in marked distress, holding her left side, and rolling around on 

the bed.  O2 saturations are between 88 and 90% on 2 L by nasal cannula.  The 

remainder of her vital signs are stable.  The patient had received a total of 

fentanyl 100 mcg IV en route and was still in significant discomfort.  She was 

placed on a cardiac monitor and laboratory studies were collected.  Stat 

portable chest x-ray was obtained. Chest x-ray noted questionable small 

parenchymal infiltrate combined with atelectasis at the left lung base, 

otherwise were clear.  She was given morphine 4 mg IV.  I-STAT labs noted an H&

H of 11.9 and 35, electrolytes and renal functions are within normal limits.  

She was cleared for a trauma CT scan of her chest, abdomen and pelvis to 

evaluate her injuries.





Chest CT noted acute fractures through the lateral and posterior portions of 

the left sixth through 11th ribs, several of the fractures are mildly 

displaced.  Subsegmental bibasilar opacities are suggestive of atelectasis.  

There is no evidence for pneumothorax or pleural effusion.  Abdominal and 

pelvic CT was negative for any acute solid organ injury within the abdomen or 

the pelvis.





All laboratory and diagnostic imaging studies were reviewed with attending 

physician who also independently evaluated the patient.  The patient was 

reassessed when she returned from CT scan and reported increased pain and was 

given a second dose of morphine 4 mg IV.  Given the multiple rib fractures, as 

well as the patient's pain level and oxygen requirement it was felt that 

admission/observation was warranted.  [facility]





Differential diagnoses entertained included pneumothorax, rib fracture, 

pulmonary contusion, hemothorax, retroperitoneal injury, splenic injury, among 

others.





Medication reconciliation: I attest that I have personally reviewed the patient'

s current medication list.


Blood pressure screening: Patient was found to have a slightly elevated blood 

pressure due to circumstances.  I do not believe that the patient requires 

hypertension monitoring.








CHEST CT WITH CONTRAST





HISTORY: Acute left-sided rib pain status post fall  FALL, L RIB PAIN





TECHNIQUE: Multiaxial CT images of the chest were performed following the


intravenous administration of contrast.  A dose lowering technique was utilized


adhering to the principles of ALARA.





COMPARISON:  CT abdomen and pelvis of same day, chest radiograph of same day.





FINDINGS: 


 Homogeneous thyroid. Mildly prominent precarinal lymph node measures 8 mm. No


pathologically enlarged lymph nodes identified. Heart is normal in size without


pericardial effusion. Coronary arterial calcifications are noted. The thoracic


aorta is normal in both course and caliber without aneurysm or dissection. The


imaged great vessels appear to be patent. Moderate mixed plaquing of the


thoracic aorta. The opacified pulmonary arterial tree is unremarkable.





Moderate emphysema. There are multifocal patchy alveolar and groundglass


opacities of the bilateral lung bases. No pneumothorax or pleural effusion.


Central airways appear patent.





No acute process of the imaged upper abdomen. Please see the separately dictated


CT abdomen and pelvis study of same day for further details. Cortical thinning


with probable cyst of the superior pole left kidney. Colonic diverticulosis. The


breast parenchyma appears unremarkable. Fusion hardware of the lumbar spine,


partially imaged. There are acute fractures noted involving the posterior and


lateral portions of the left sixth through 11th ribs. Some of the fractures are


mildly displaced. No acute compression deformity. Grade 1 retrolisthesis L2 on


L3 with severe intervertebral disc space narrowing.





IMPRESSION: 





1. Acute fractures involve the lateral and posterior portions of the left sixth


through 11th ribs with several of the fractures mildly displaced. No


pneumothorax.


2. Subsegmental bibasilar opacities suggest atelectasis with pneumonia or


aspiration pneumonitis thought to be less likely.


3. Emphysema.








CT ABD/PELVIS IV AND ORAL CONT





CLINICAL HISTORY: Left-sided abdominal pain status post trauma    





COMPARISON STUDY:  May 15, 2014





TECHNIQUE: Following the IV administration of 92 mL of Optiray-320, CT scan of


the abdomen and pelvis was performed from the lung bases to the proximal femurs.


Images are reviewed in the axial, sagittal, and coronal planes. IV contrast was


administered without complication.  A dose lowering technique was utilized


adhering to the principles of ALARA.








CT DOSE: 720.71 mGy.cm





FINDINGS:





Lower chest: There are bibasal or dependent airspace opacities, likely


atelectatic. No pneumothorax is visualized.





Liver: The contrast-enhanced liver is normal in size, contour, and attenuation.


There is no intrahepatic biliary ductal dilatation. The hepatic veins and portal


veins are patent.





Gallbladder: There is a small gallbladder polyp versus noncalcified stone





Spleen: Normal in size and attenuation.





Pancreas: Unremarkable.





Adrenal glands: Unremarkable.





Kidneys: There is no evidence of acute renal injury. There is a 9 mm hypodensity


within the upper pole the left kidney likely representing a cyst.





Bowel: There are no transition zones indicate bowel obstruction. There is


moderately extensive colonic diverticulosis. There are no acute peridiverticular


inflammatory changes present. There are no findings to indicate acute


appendicitis. There is no pathologic interloop fluid. No extraluminal gas


collections are visualized.





Peritoneum: There is no intraperitoneal free air or abdominal ascites.





Vasculature: The abdominal aorta is normal in course and caliber.





Adenopathy: None.





Pelvic viscera: The uterus appears surgically absent.





Skeletal structures: There are postsurgical changes of a total right hip


arthroplasty. The acetabular screw extends into the right iliopsoas muscle.


There are postsurgical changes within the lumbar spine with evidence for a


posterior spur fusion with pedicle screw fixation. There is probable loosening


of the right L4 pedicle screw. There are advanced degenerative changes the L3-4


level with discogenic endplate sclerosis and mild retrolisthesis of L3 on L4.


There are fractures of the left sixth seventh eighth ninth and 10th and 11th


ribs.





IMPRESSION:  


1. Acute fractures of the left sixth through 11th ribs.


2. No evidence of pneumothorax


3. Bibasal airspace opacities, likely atelectatic


4. No evidence of solid organ injury within the abdomen or pelvis


5. Extensive diverticulosis. No evidence of acute diverticulitis


6. Total right hip arthroplasty. The acetabular screw extends into the right


iliopsoas muscle


7. Postsurgical changes in the lumbar spine. Probable loosening of the right L4


pedicle screw. Advanced degenerative changes the L3-4 level.








CHEST ONE VIEW PORTABLE





CLINICAL HISTORY: EVAL L RIB PAIN pain





COMPARISON STUDY:  5/21/2014





FINDINGS: Small parenchymal infiltrate combined with atelectasis left lung base.


Lungs otherwise are clear. Diaphragms are smooth. Very slight blunting left


lateral costophrenic angle.





IMPRESSION:  Small parenchymal infiltrate combine with minimal atelectasis left


lung base.


Problem List


Medical Problems:


(1) Abdominal pain


Status: Resolved  





(2) Abdominal pain


Status: Resolved  





(3) Anemia


Status: Resolved  





(4) Back pain


Status: Resolved  





(5) Benign hypertension


Status: Chronic  





(6) Bipolar disorder


Status: Chronic  





(7) Bradycardia


Status: Resolved  





(8) Concussion


Status: Resolved  





(9) Deep venous thrombosis


Status: Resolved  





(10) Depression


Status: Chronic  





(11) Dyslipidemia


Status: Chronic  





(12) Edema of lower extremity


Status: Resolved  





(13) History of calculus of kidney


Status: Resolved  





(14) Humeral head fracture


Status: Resolved  





(15) Hypothyroidism


Status: Chronic  





(16) Osteoporosis


Status: Chronic  





(17) Partial seizure


Status: Chronic  





(18) Postoperative ileus


Status: Resolved  





(19) Recurrent falls


Status: Chronic  





(20) Sigmoid diverticulitis


Status: Chronic  





Surgical Problems:


(1) s/p appendectomy


Status: Resolved  





(2) s/p hysterectomy


Status: Resolved  





(3) s/p lumbar spine surgery


Status: Resolved  





(4) s/p uvulectomy


Status: Resolved  











Current/Historical Medications


Scheduled


Alendronate/Cholecalciferol (Fosamax+D 70MG/2800 Iu), 1 TABLET PO WK


Apoaequorin (Prevagen), 1 CAP PO BID


Aspirin (Aspirin Ec), 81 MG PO 1400


Calcium/Vitamin D (Os-Janes 500 Plus D), 2 TAB PO 1400


Cyanocobalamin (Vitamin B-12 1000 Mcg), 1,000 MCG PO QAM


Divalproex Sodium (Depakote Delay Rel), 250 MG PO HS


Docusate Sodium (Colace), 1 CAP PO QPM


Fluticasone Prop/Salmeterol (Advair Diskus 250/50 60 Dose), 1 PUFF INH BID


Lamotrigine (Lamictal), 2 TABS PO BID


Methylphenidate (Ritalin), 10 MG PO QPM


Methylphenidate (Ritalin), 20 MG PO QAM


Nitroglycerin (Nitrostat), 0.3 MG UT PRN


Omega 3 Fatty Acids-Lutein-Thuy ([facility]), 1 CAP PO BID


Pregabalin (Lyrica), 2 TABS PO BID


Ranitidine (Zantac), 150 MG PO TID


Trazodone Hcl (Trazodone), 100 MG PO HS


Verapamil Hcl (Calan Sr Ext Rel), 180 MG PO QAM


[Imitrex], 1 TAB PO UD





Scheduled PRN


Albuterol Sulfate (Proair Respiclick), 2 PUFFS INH Q4H PRN for SOB/Wheezing


Meclizine Hcl (Meclizine Hcl), 1 TAB PO TID PRN for Dizziness or Vertigo





Allergies


Coded Allergies:  


     Oyster (Verified  Allergy, Mild, N/V, 12/1/17)


     Scallop (Verified  Allergy, Mild, N/V, 12/1/17)


     Sulfamethoxazole w/Trimethoprim (Verified  Adverse Reaction, Mild, N/V, 10/

2/17)





Vital Signs











  Date Time  Temp Pulse Resp B/P (MAP) Pulse Ox O2 Delivery O2 Flow Rate FiO2


 


7/27/18 15:26  58 18 138/69 91 Nasal Cannula 4.0 


 


7/27/18 14:29  86 20 142/71 98 Nasal Cannula  


 


7/27/18 12:05 36.8 78 20 152/91 92 Room Air  











Laboratory Results


7/27/18 13:51

















Test


  7/27/18


13:51 7/27/18


13:56


 


Est Creatinine Clear Calc


Drug Dose 55.2 ml/min 


  


 


 


Estimated GFR (


American) 84.0 


  


 


 


Estimated GFR (Non-


American 72.5 


  


 


 


BUN/Creatinine Ratio 20.7 (10-20)  


 


Calcium Level


  8.4 mg/dl


(8.5-10.1) 


 


 


Bedside Hemoglobin


  


  11.9 g/dl


(12.0-16.0)


 


Bedside Hematocrit  35 % (37-47) 


 


Bedside Sodium


  


  140 mEq/L


(135-144)


 


Bedside Potassium


  


  4.1 mEq/L


(3.3-5.0)


 


Bedside Chloride


  


  105 mEq/L


(101-112)


 


Bedside Total CO2


  


  24 mEq/l


(24-31)


 


Anion Gap


  


  16.0 mmol/L


(16-25)


 


Bedside Blood Urea Nitrogen


  


  17 mg/dl


(7-18)


 


Bedside Creatinine


  


  0.9 mg/dl


(0.6-1.3)


 


Bedside Glucose (other)


  


  85 mg/dl


(70-99)


 


Bedside Ionized Calcium (Jhon)


  


  1.14 mmol/l


(1.12-1.32)











Medications Administered











 Medications


  (Trade)  Dose


 Ordered  Sig/Kennedy


 Route  Start Time


 Stop Time Status Last Admin


Dose Admin


 


 Morphine Sulfate


  (MoRPHine


 SULFATE INJ)  4 mg  NOW  STAT


 IV  7/27/18 12:37


 7/27/18 12:41 DC 7/27/18 12:37


4 MG


 


 Sodium Chloride  500 ml @ 


 999 mls/hr  Q31M STAT


 IV  7/27/18 12:37


 7/27/18 13:07 DC 7/27/18 12:37


999 MLS/HR


 


 Morphine Sulfate


  (MoRPHine


 SULFATE INJ)  4 mg  NOW  STAT


 IV  7/27/18 15:13


 7/27/18 15:14 DC 7/27/18 15:26


4 MG











Departure Information


Impression





 Primary Impression:  


 Multiple fractures of ribs of left side





Dispostion


Being Evaluated By Hospitalist





Tay Mcbride M.D. (PCP)





Patient Instructions


[facility]





Problem Qualifiers








 Primary Impression:  


 Multiple fractures of ribs of left side


 Encounter type:  initial encounter  Fracture type:  closed  Qualified Codes:  

S22.42XA - Multiple fractures of ribs, left side, initial encounter for closed 

fracture

## 2018-07-27 NOTE — DIAGNOSTIC IMAGING REPORT
CHEST CT WITH CONTRAST



HISTORY: Acute left-sided rib pain status post fall  FALL, L RIB PAIN



TECHNIQUE: Multiaxial CT images of the chest were performed following the

intravenous administration of contrast.  A dose lowering technique was utilized

adhering to the principles of ALARA.



COMPARISON:  CT abdomen and pelvis of same day, chest radiograph of same day.



FINDINGS: 

 Homogeneous thyroid. Mildly prominent precarinal lymph node measures 8 mm. No

pathologically enlarged lymph nodes identified. Heart is normal in size without

pericardial effusion. Coronary arterial calcifications are noted. The thoracic

aorta is normal in both course and caliber without aneurysm or dissection. The

imaged great vessels appear to be patent. Moderate mixed plaquing of the

thoracic aorta. The opacified pulmonary arterial tree is unremarkable.



Moderate emphysema. There are multifocal patchy alveolar and groundglass

opacities of the bilateral lung bases. No pneumothorax or pleural effusion.

Central airways appear patent.



No acute process of the imaged upper abdomen. Please see the separately dictated

CT abdomen and pelvis study of same day for further details. Cortical thinning

with probable cyst of the superior pole left kidney. Colonic diverticulosis. The

breast parenchyma appears unremarkable. Fusion hardware of the lumbar spine,

partially imaged. There are acute fractures noted involving the posterior and

lateral portions of the left sixth through 11th ribs. Some of the fractures are

mildly displaced. No acute compression deformity. Grade 1 retrolisthesis L2 on

L3 with severe intervertebral disc space narrowing.



IMPRESSION: 



1. Acute fractures involve the lateral and posterior portions of the left sixth

through 11th ribs with several of the fractures mildly displaced. No

pneumothorax.

2. Subsegmental bibasilar opacities suggest atelectasis with pneumonia or

aspiration pneumonitis thought to be less likely.

3. Emphysema.







Electronically signed by:  Nasir Bean M.D.

7/27/2018 3:12 PM



Dictated Date/Time:  7/27/2018 2:57 PM

## 2018-07-27 NOTE — EMERGENCY ROOM VISIT NOTE
ED Visit Note


First contact with patient:  12:11


The patient was seen and examined with Lesley Stark PA-C.  I agree with the 

history, physical and findings.  Please see the note for disposition and 

details.
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
## 2018-07-28 VITALS
HEART RATE: 61 BPM | SYSTOLIC BLOOD PRESSURE: 122 MMHG | TEMPERATURE: 97.7 F | OXYGEN SATURATION: 93 % | DIASTOLIC BLOOD PRESSURE: 69 MMHG

## 2018-07-28 VITALS — OXYGEN SATURATION: 93 %

## 2018-07-28 VITALS
TEMPERATURE: 97.52 F | HEART RATE: 47 BPM | OXYGEN SATURATION: 93 % | SYSTOLIC BLOOD PRESSURE: 131 MMHG | DIASTOLIC BLOOD PRESSURE: 66 MMHG

## 2018-07-28 VITALS
SYSTOLIC BLOOD PRESSURE: 131 MMHG | HEART RATE: 52 BPM | DIASTOLIC BLOOD PRESSURE: 66 MMHG | TEMPERATURE: 97.52 F | OXYGEN SATURATION: 93 %

## 2018-07-28 VITALS
HEART RATE: 55 BPM | SYSTOLIC BLOOD PRESSURE: 153 MMHG | OXYGEN SATURATION: 93 % | DIASTOLIC BLOOD PRESSURE: 79 MMHG | TEMPERATURE: 97.52 F

## 2018-07-28 VITALS — HEART RATE: 52 BPM

## 2018-07-28 VITALS — OXYGEN SATURATION: 96 %

## 2018-07-28 LAB
BUN SERPL-MCNC: 12 MG/DL (ref 7–18)
CALCIUM SERPL-MCNC: 8.1 MG/DL (ref 8.5–10.1)
CO2 SERPL-SCNC: 24 MMOL/L (ref 21–32)
CREAT SERPL-MCNC: 0.9 MG/DL (ref 0.6–1.2)
EOSINOPHIL NFR BLD AUTO: 186 K/UL (ref 130–400)
GLUCOSE SERPL-MCNC: 70 MG/DL (ref 70–99)
HCT VFR BLD CALC: 41.1 % (ref 37–47)
HGB BLD-MCNC: 13.3 G/DL (ref 12–16)
MCH RBC QN AUTO: 30.4 PG (ref 25–34)
MCHC RBC AUTO-ENTMCNC: 32.4 G/DL (ref 32–36)
MCV RBC AUTO: 94.1 FL (ref 80–100)
PMV BLD AUTO: 11.8 FL (ref 7.4–10.4)
POTASSIUM SERPL-SCNC: 4.2 MMOL/L (ref 3.5–5.1)
RED CELL DISTRIBUTION WIDTH CV: 14.1 % (ref 11.5–14.5)
RED CELL DISTRIBUTION WIDTH SD: 48.4 FL (ref 36.4–46.3)
SODIUM SERPL-SCNC: 140 MMOL/L (ref 136–145)
WBC # BLD AUTO: 5.95 K/UL (ref 4.8–10.8)

## 2018-07-28 RX ADMIN — MORPHINE SULFATE PRN MG: 4 INJECTION, SOLUTION INTRAMUSCULAR; INTRAVENOUS at 01:28

## 2018-07-28 RX ADMIN — Medication SCH MG: at 13:58

## 2018-07-28 RX ADMIN — METHYLPHENIDATE HYDROCHLORIDE SCH MG: 10 TABLET ORAL at 09:03

## 2018-07-28 RX ADMIN — METHYLPHENIDATE HYDROCHLORIDE SCH MG: 10 TABLET ORAL at 11:50

## 2018-07-28 RX ADMIN — MORPHINE SULFATE PRN MG: 4 INJECTION, SOLUTION INTRAMUSCULAR; INTRAVENOUS at 15:50

## 2018-07-28 RX ADMIN — HEPARIN SODIUM SCH UNIT: 10000 INJECTION, SOLUTION INTRAVENOUS; SUBCUTANEOUS at 21:25

## 2018-07-28 RX ADMIN — DOCUSATE SODIUM SCH MG: 100 CAPSULE, LIQUID FILLED ORAL at 20:11

## 2018-07-28 RX ADMIN — FLUTICASONE PROPIONATE AND SALMETEROL SCH PUFF: 50; 250 POWDER RESPIRATORY (INHALATION) at 20:11

## 2018-07-28 RX ADMIN — PREGABALIN SCH MG: 150 CAPSULE ORAL at 20:12

## 2018-07-28 RX ADMIN — HEPARIN SODIUM SCH UNIT: 10000 INJECTION, SOLUTION INTRAVENOUS; SUBCUTANEOUS at 13:57

## 2018-07-28 RX ADMIN — Medication SCH MCG: at 09:04

## 2018-07-28 RX ADMIN — PREGABALIN SCH MG: 150 CAPSULE ORAL at 09:03

## 2018-07-28 RX ADMIN — TRAZODONE HYDROCHLORIDE SCH MG: 100 TABLET ORAL at 20:14

## 2018-07-28 RX ADMIN — MORPHINE SULFATE PRN MG: 4 INJECTION, SOLUTION INTRAMUSCULAR; INTRAVENOUS at 08:17

## 2018-07-28 RX ADMIN — SODIUM CHLORIDE SCH MLS/HR: 900 INJECTION, SOLUTION INTRAVENOUS at 09:09

## 2018-07-28 RX ADMIN — HYDROCODONE BITARTRATE AND ACETAMINOPHEN PRN TAB: 5; 325 TABLET ORAL at 14:41

## 2018-07-28 RX ADMIN — RANITIDINE SCH MG: 150 TABLET ORAL at 09:04

## 2018-07-28 RX ADMIN — FLUTICASONE PROPIONATE AND SALMETEROL SCH PUFF: 50; 250 POWDER RESPIRATORY (INHALATION) at 09:01

## 2018-07-28 RX ADMIN — SODIUM CHLORIDE SCH MLS/HR: 900 INJECTION, SOLUTION INTRAVENOUS at 21:24

## 2018-07-28 RX ADMIN — RANITIDINE SCH MG: 150 TABLET ORAL at 20:13

## 2018-07-28 RX ADMIN — HEPARIN SODIUM SCH UNIT: 10000 INJECTION, SOLUTION INTRAVENOUS; SUBCUTANEOUS at 06:00

## 2018-07-28 RX ADMIN — MORPHINE SULFATE PRN MG: 4 INJECTION, SOLUTION INTRAMUSCULAR; INTRAVENOUS at 19:30

## 2018-07-28 RX ADMIN — Medication SCH TAB: at 13:58

## 2018-07-28 RX ADMIN — DIVALPROEX SODIUM SCH MG: 250 TABLET, DELAYED RELEASE ORAL at 20:14

## 2018-07-28 RX ADMIN — KETOROLAC TROMETHAMINE PRN MG: 15 INJECTION INTRAMUSCULAR; INTRAVENOUS at 05:11

## 2018-07-28 RX ADMIN — KETOROLAC TROMETHAMINE PRN MG: 15 INJECTION INTRAMUSCULAR; INTRAVENOUS at 11:44

## 2018-07-28 NOTE — DIAGNOSTIC IMAGING REPORT
L HUMERUS MIN 2 VIEWS ROUTINE



CLINICAL HISTORY: Left humeral pain  TRAUMA



COMPARISON: None.



DISCUSSION: No acute fractures or dislocations of the humerus are visualized.

There are multiple left-sided rib fractures with left pleural fluid/left basilar

parenchymal consolidation    



IMPRESSION: 

1. No humeral fractures identified

2. Left-sided rib fractures







Electronically signed by:  Jone Recinos M.D.

7/28/2018 2:13 PM



Dictated Date/Time:  7/28/2018 2:12 PM

## 2018-07-28 NOTE — NEUROLOGY CONSULTATION
Neurology Consultation


Date of Consultation:


2018.


Attending Physician:


Abena Hi DO


Primary Care Physician:


Tay Chávez M.D.


Reason for Consultation:


Patient is a 68-year-old who was asked to see the request of Dr. Hi, for 

neurologic consultation regarding falls.


History of Present Illness


Source:  patient, caregiver, clinic records, hospital records


Patient 1st saw Dr. Simental as an outpatient in . She has a history of 

intermittent migrainous type headaches.  Currently these are controlled fairly 

well on verapamil daily plus Imitrex as needed.


She has a history of chronic cognitive issues of a mild nature.  She is post 

formal neuropsychological testing in 2017, showing a vascular 

dementia.  Severe depression and her history of chronic, heavy alcohol use in 

the past add to her cognitive issues.  This however is stable.





She has a longstanding history of severe bipolar disorder and depression.  She 

is on Depakote 250 milligrams a day plus Lamictal 300 milligrams twice daily.  

She is on Ritalin 10 milligrams in the morning and 20 milligrams at night.  She 

is on trazodone 100 milligrams in the evening.


She takes Lyrica 300 milligrams twice daily for fibromyalgia and neuropathy 

pain.





The patient has had balance issues for years.  She has had multiple falls over 

time because of this.  She has a known polyneuropathy, diagnosed by EMG in 

, which gives her a sensory ataxia.  Etiology of the polyneuropathy is not 

apparent but may be related to chronic alcohol abuse.


She has some benign paroxysmal positional vertigo which is fairly well 

controlled as well.  Recent physical therapy at [facility] has helped to some degree.





Unfortunately, she fell about a year ago hitting her head creating a mild 

concussion.  About 6 months ago she fell and hit her head again.  She thinks 

that her balance is much worse over the last year since these concussions.  She 

uses a cane for support.


She last saw Dr. Simental in May of 2018 and she was improved and stable from 

any concussive issue.





On , at 7 o'clock in the morning, the patient was working with cans, 

lost her balance and fell with the left side landing on the cans.  She injured 

her left shoulder and fractured multiple ribs.


In the emergency room, CT scan of the chest showed rib fractures left  through 

11th as well as some emphysema changes.


CT scan of the abdomen and pelvis showed some diverticulosis and a chest x-ray 

was largely unremarkable.





This morning she has left shoulder pain and left-sided rib pain particularly 

with a deep breath.  She denies headaches, confusion, vision problems, or new 

symptoms in her arms or legs.





Past Medical/Surgical History


Medical Problems:


(1) Benign hypertension


Status: Chronic  





(2) Bipolar disorder


Status: Chronic  





(3) Depression


Status: Chronic  





(4) Dyslipidemia


Status: Chronic  





(5) Hypothyroidism


Status: Chronic  





(6) Multiple fractures of ribs of left side


Status: Acute  





(7) Osteoporosis


Status: Chronic  





(8) Partial seizure


Status: Chronic  





(9) Recurrent falls


Status: Chronic  





(10) Sigmoid diverticulitis


Status: Chronic  





Vascular dementia, mild


Benign paroxysmal positional vertigo in the past, currently improved


Significant bipolar disorder followed by psychiatry


Poor balance with falling, multifactorial


Hypertension


History of DVT in the past


Obstructive sleep apnea with daytime fatigue


History of migraine headaches, improved and stable





Post left total hip replacement for osteoarthritis


Lumbar spine surgery by Dr. Kelli evnas in 


Post appendectomy and hysterectomy


Uvulectomy


Bilateral cataract repair








Family History


Mother  age 87 possible heart issues.  Father  age 87 of end-stage 

senile dementia of the Alzheimer's type





Social History


Patient smokes about a pack of cigarettes every 4 days.  She is a longstanding 

smoker.


Patient was a very heavy alcohol user in the past stopping about 15 years ago.


Patient lives alone in [place].  She has been disabled since early to mid 

50s because of her bipolar disorder.  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


Smoking Status:  Current some day smoker


Smokeless Tobacco Use:  No


Alcohol Use:  none


Drug Use:  none


Marital Status:  


Housing Status:  lives alone


Occupation Status:  retired, disabled





Allergies


Coded Allergies:  


     Oyster (Verified  Allergy, Mild, N/V, 18)


     Scallop (Verified  Allergy, Mild, N/V, 18)


     Sulfamethoxazole w/Trimethoprim (Verified  Adverse Reaction, Mild, N/V, )





Current Inpatient Medications





Current Inpatient Medications








 Medications


  (Trade)  Dose


 Ordered  Sig/Kennedy


 Route  Start Time


 Stop Time Status Last Admin


Dose Admin


 


 Ioversol


  (Optiray 320)  111 ml  UD  PRN


 IV  18 15:00


 18 14:59   


 


 


 Morphine Sulfate


  (MoRPHine


 SULFATE INJ)  4 mg  Q1H  PRN


 IV  18 16:00


 8/10/18 15:59  18 17:19


4 MG


 


 Heparin Sodium


  (Porcine)


  (Heparin Sq 5000


 Unit/0.5ml)  5,000 unit  Q8H


 SQ  18 22:00


 18 21:59  18 21:25


5,000 UNIT


 


 Acetaminophen


  (Tylenol Tab)  650 mg  Q4H  PRN


 PO  18 16:30


 18 16:29   


 


 


 Ondansetron HCl


  (Zofran Inj)  4 mg  Q6H  PRN


 IV  18 16:30


 18 16:29   


 


 


 Aspirin


  (Ecotrin Tab)  81 mg  1400


 PO  18 14:00


 18 13:59   


 


 


 Calcium/Vitamin D


  (Caltrate Plus


 Tab)  2 tab  1400


 PO  18 14:00


 18 13:59   


 


 


 Cyanocobalamin


  (Vitamin B-12


 Tab)  1,000 mcg  QAM


 PO  18 08:00


 18 08:59  18 09:04


1,000 MCG


 


 Divalproex Sodium


  (Depakote Delay


 Rel Tab)  250 mg  HS


 PO  18 21:00


 18 20:59  18 21:20


250 MG


 


 Docusate Sodium


  (coLACE CAP)  100 mg  QPM


 PO  18 21:00


 18 20:59  18 21:20


100 MG


 


 Salmeterol


 Xinafoate/


 Fluticasone


  (Advair Diskus


 250/50 Inh)  1 puff  BID


 INH  18 20:00


 18 20:59  18 09:01


1 PUFF


 


 Lamotrigine


  (Lamictal Tab)  200 mg  BID


 PO  18 20:00


 18 20:59  18 09:02


200 MG


 


 Meclizine HCl


  (Antivert Tab)  25 mg  TID  PRN


 PO  18 16:30


 18 16:29   


 


 


 Methylphenidate


 HCl


  (Ritalin Tab)  10 mg  DAILY@1200


 PO  18 12:00


 18 11:59  7/28/18 11:50


10 MG


 


 Nitroglycerin


  (Nitrostat Tab)  0.3 mg  PRN  PRN


 UT  18 16:30


 18 16:29   


 


 


 Pregabalin


  (Lyrica Cap)  300 mg  BID


 PO  18 20:00


 18 20:59  18 09:03


300 MG


 


 Ranitidine HCl


  (zANTac TAB)  150 mg  BID


 PO  18 20:00


 18 20:59  18 09:04


150 MG


 


 Trazodone HCl


  (Desyrel Tab)  100 mg  HS


 PO  18 21:00


 18 20:59  18 21:19


100 MG


 


 Verapamil HCl


  (Calan-Sr Tab)  180 mg  QAM


 PO  18 08:00


 18 08:59   


 


 


 Albuterol


  (Ventolin Hfa


 Inhaler)  2 puffs  Q4H  PRN


 INH  18 16:30


 18 16:29   


 


 


 Methylphenidate


 HCl


  (Ritalin Tab)  20 mg  QAM


 PO  18 08:00


 18 08:59  18 09:03


20 MG


 


 Morphine Sulfate


  (MoRPHine


 SULFATE INJ)  4 mg  Q4H  PRN


 IV  18 16:30


 8/10/18 16:29  18 08:17


4 MG


 


 Ketorolac


 Tromethamine


  (Toradol Inj)  15 mg  Q6H  PRN


 IV.  18 16:30


 18 16:29  18 11:44


15 MG


 


 Sodium Chloride  1,000 ml @ 


 75 mls/hr  [id number]


 IV  18 19:15


 18 19:14  18 09:09


75 MLS/HR


 


 Albuterol/


 Ipratropium


  (Duoneb)  3 ml  Q4R  PRN


 INH  18 17:00


 18 16:59   


 


 


 Miscellaneous


  (Iv Fluids


 Completed)  1 ea  PRN  PRN


 N/A  18 17:30


 19 17:29   


 











Review of Systems


Constitutional:  + weakness, + fatigue


Eyes:  No worsening of vision, No diplopia


ENT:  No sore throat, No trouble swallowing


Respiratory:  No cough, No shortness of breath


Cardiovascular:  + chest pain, No palpitations


Abdomen:  No pain, No nausea


Musculoskeletal:  No joint pain, No muscle pain


Genitourinary - Female:  No dysuria, No urinary incontinence


Neurologic:  + memory loss, + balance problems, No weakness, No numbness/

tingling


Psychiatric:  + depression symptoms, + anxiety


Endocrine:  + fatigue


Hematologic / Lymphatic:  No abnormal bleeding/bruising


Integumentary:  No rash


Allergic / Immunologic:  No hives





Physical Exam


Vital Signs (Past 24 Hrs):











  Date Time  Temp Pulse Resp B/P (MAP) Pulse Ox O2 Delivery O2 Flow Rate FiO2


 


18 10:15  52      


 


18 08:00     93 Nasal Cannula 2.5 


 


18 07:46 36.4 47 20 131/66 (87) 93  2.0 


 


18 00:48     96 Nasal Cannula 4.0 


 


18 22:33 36.0 50 20 121/81 (94) 95 Nasal Cannula 4.0 


 


18 19:03 35.9 49 18 147/83 96 Nasal Cannula 4.0 


 


18 19:01 35.9 49 18 147/83 (104) 96 Nasal Cannula 4.0 


 


18 17:57  53 18 127/66 95   


 


18 17:09  53 18 139/79 95 Nasal Cannula 4.0 


 


18 15:26  58 18 138/69 91 Nasal Cannula 4.0 


 


18 14:29  86 20 142/71 98 Nasal Cannula  








Patient is right-handed.





The patient is awake and alert. Speech is normal without aphasia or dysarthria. 

Mentation and thought processes are intact with full orientation and normal 

fund of knowledge to conversation. Mood and affect are normal and appropriate. 

Appearance and grooming are normal.  Memory is somewhat poor with short-term 

memory and hesitant but fairly good with long-term memory.





The discs are sharp with positive venous pulsations.  There are no exudates, 

hemorrhages, or blood vessel changes seen.  Pupils are 3mm bilaterally and 

reactive to light.  Extraocular eye muscles are intact but there is lateral 

nystagmus bilaterally with fast component in the direction of the gaze.  This 

does not seem to impair her vision.  Visual acuity and visual fields seem 

normal grossly to confrontation.





There are no deficits to sensation of the face bilaterally.  Corneal reflexes 

are positive bilaterally.  Facial strength and symmetry is normal bilaterally.  

Hearing seems intact grossly to voice and finger rub.  Palate moves well 

without asymmetry.  There is normal sternocleidomastoid and trapezius strength 

bilaterally.  Tongue is midline with good strength bilaterally.





Neck is with full range of motion without discomfort.  There are no cervical 

bruits.  There are no cranial or ocular bruits.





Heart is without murmur.





Cervical, thoracic, and lumbar spine are nontender to palpation.





Gait is not tested and stance sitting up in bed is hard because of her ribcage 

pain on the left





With outstretched arms there is no drift.  There are no resting, postural, or 

action tremors.  There is no ataxia with finger-to-nose testing.  There is good 

facility in the hands. There are no abnormal involuntary movements noted.





Motor strength is 5/5 for age and condition diffusely in the arms bilaterally 

including deltoids, biceps, brachioradialis, wrist flexors and extensors, , 

and intrinsic hand muscles. Motor strength is 5/5 for age and condition 

diffusely in the legs bilaterally including hip flexors, quadriceps, hamstring, 

gastrocnemius, tibialis anterior, tibialis posterior, and peroneii muscles 

bilaterally.  Toe extensors are normal and there is good bulk in the extensor 

digitorum brevis muscle bilaterally.





The limbs have good tone without rigidity or spasticity, and there is no focal 

atrophy noted.  She is thin all musculature throughout distally,  there is no 

tenderness, no myotonia noted to percussion, and no fasciculations seen. 





Sensory examination reveals a decreased sensation to pin and touch in the feet 

bilaterally





Reflexes are 0/4 in the biceps, triceps, brachioradialis, quadriceps, and 

Achilles tendons bilaterally.





Toes are downgoing with plantar stimulation bilaterally.





Peripheral pulses are present and of normal quality distally in all four limbs. 

There is no peripheral edema noted.





Laboratory Results


Past 24 Hours:


18 07:13








18 07:13

















Test


  18


13:56 18


19:43 18


07:13


 


Bedside Hemoglobin


  11.9 g/dl


(12.0-16.0) 


  


 


 


Bedside Hematocrit 35 % (37-47)   


 


Bedside Sodium


  140 mEq/L


(135-144) 


  


 


 


Bedside Potassium


  4.1 mEq/L


(3.3-5.0) 


  


 


 


Bedside Chloride


  105 mEq/L


(101-112) 


  


 


 


Bedside Total CO2


  24 mEq/l


(24-31) 


  


 


 


Bedside Blood Urea Nitrogen


  17 mg/dl


(7-18) 


  


 


 


Bedside Creatinine


  0.9 mg/dl


(0.6-1.3) 


  


 


 


Bedside Glucose (other)


  85 mg/dl


(70-99) 


  


 


 


Bedside Ionized Calcium (Jhon)


  1.14 mmol/l


(1.12-1.32) 


  


 


 


Prothrombin Time


  


  10.0 SECONDS


(9.0-12.0) 


 


 


Prothromb Time International


Ratio 


  1.0 (0.9-1.1) 


  


 


 


Hepatitis C Antibody Screen  NEG (NEG)  


 


Red Blood Count


  


  


  4.37 M/uL


(4.2-5.4)


 


Mean Corpuscular Volume


  


  


  94.1 fL


()


 


Mean Corpuscular Hemoglobin


  


  


  30.4 pg


(25-34)


 


Mean Corpuscular Hemoglobin


Concent 


  


  32.4 g/dl


(32-36)


 


RDW Standard Deviation


  


  


  48.4 fL


(36.4-46.3)


 


RDW Coefficient of Variation


  


  


  14.1 %


(11.5-14.5)


 


Mean Platelet Volume


  


  


  11.8 fL


(7.4-10.4)


 


Anion Gap


  


  


  6.0 mmol/L


(3-11)


 


Est Creatinine Clear Calc


Drug Dose 


  


  50.3 ml/min 


 


 


Estimated GFR (


American) 


  


  75.1 


 


 


Estimated GFR (Non-


American 


  


  64.8 


 


 


BUN/Creatinine Ratio   13.2 (10-20) 


 


Calcium Level


  


  


  8.1 mg/dl


(8.5-10.1)


 


Magnesium Level


  


  


  2.1 mg/dl


(1.8-2.4)


 


25-Hydroxy Vitamin D Total


  


  


  32.1 ng/ml


()


 


Thyroid Stimulating Hormone


(TSH) 


  


  5.960 uIu/ml


(0.300-4.500)











Impression


1. Gait disturbance





This is multifactorial and is likely predominantly due to her polyneuropathy.  

Polyneuropathy is idiopathic although I suspect the longstanding alcohol abuse 

has contributed.


She has vascular dementia and small vessel ischemia can lead to balance 

problems as well


She has had lumbar spine surgery and has chronic pain.





2.  Vascular dementia, mild, and stable





3. Severe depression and bipolar disorder on multiple medications.





4. History of migraine headaches, improved and stable.





5. History of concussions with falls within the last year and a she seems to 

have recovered from any symptomatology from these.





Plan


1. I see no need for additional neurologic testing currently.





2.  She would likely benefit from physical occupational therapy and perhaps a 

visit at [facility] to help with her gait.





She may need a 4 wheeled walker instead of a cane for support.





3. Left shoulder pain from falling yesterday -consider x-ray of the shoulder 

and humerus.





Dr. Simental will follow the patient as an outpatient.








Overall, I spent 60 minutes with this case including records review, direct 

evaluation the patient at bedside, and communication with Dr. Slaughter, 

clinical staff, the patient herself at bedside including differential diagnosis 

and treatment options.  Greater than half of this 60 minutes was spent in 

consultation with the patient directly at bedside.

## 2018-07-28 NOTE — DIAGNOSTIC IMAGING REPORT
L SHOULDER MIN 2 VIEWS ROUTINE



CLINICAL HISTORY: Left shoulder pain status post trauma     



COMPARISON: None.



DISCUSSION: No acute fractures or dislocations of the left shoulder are

visualized. Degenerative changes are present within the AC joint. There are

left-sided rib fractures present. There are basilar airspace opacities.    



IMPRESSION: 

1. No fractures or dislocations of the left shoulder visualized

2. Left-sided rib fractures







Electronically signed by:  Jone Recinos M.D.

7/28/2018 2:15 PM



Dictated Date/Time:  7/28/2018 2:14 PM

## 2018-07-28 NOTE — PROGRESS NOTE
Medicine Progress Note


Date & Time of Visit:


Jul 28, 2018 at 13:35.


Subjective


seem sitting up in bed


not in distress


states left sided rib pain is about the same, but improves with pain medication


no dyspnea, no cough


reports some discomfort over the left shoulder





no other symptoms





Objective





Last 8 Hrs








  Date Time  Temp Pulse Resp B/P (MAP) Pulse Ox O2 Delivery O2 Flow Rate FiO2


 


7/28/18 10:15  52      


 


7/28/18 08:00     93 Nasal Cannula 2.5 


 


7/28/18 07:46 36.4 47 20 131/66 (87) 93  2.0 








Physical Exam:





General- oriented x 3, not in distress, speaks in sentences 





Head-  atraumatic





Eyes-  anicteric





ENT- oropharynx clear





Neck- supple, no JVD, no adenopathy, no thyromegaly





Lungs- clear to auscultation bilaterally. no rales/wheezes


(+) tenderness on the posterolateral ribs- left


small hematoma on the left breast





Heart- regular rhythm; no murmur, normal rate





Abdomen- normal bowel sounds, soft, nontender, no masses or hepatosplenomegaly





Extremities- no pretibial edema, no calf tenderness; peripheral pulses intact





Neuro- alert, oriented x 3;no gross focal neuro deficits





Skin- warm & dry


Laboratory Results:





Last 24 Hours








Test


  7/27/18


13:51 7/27/18


13:56 7/27/18


19:43 7/28/18


07:13


 


Sodium Level 141 mmol/L    140 mmol/L 


 


Potassium Level 4.1 mmol/L    4.2 mmol/L 


 


Chloride Level 110 mmol/L    110 mmol/L 


 


Carbon Dioxide Level 26 mmol/L    24 mmol/L 


 


Anion Gap 5.0 mmol/L  16.0 mmol/L   6.0 mmol/L 


 


Blood Urea Nitrogen 17 mg/dl    12 mg/dl 


 


Creatinine 0.82 mg/dl    0.90 mg/dl 


 


Est Creatinine Clear Calc


Drug Dose 55.2 ml/min 


  


  


  50.3 ml/min 


 


 


Estimated GFR (


American) 84.0 


  


  


  75.1 


 


 


Estimated GFR (Non-


American 72.5 


  


  


  64.8 


 


 


BUN/Creatinine Ratio 20.7    13.2 


 


Random Glucose 82 mg/dl    70 mg/dl 


 


Calcium Level 8.4 mg/dl    8.1 mg/dl 


 


Bedside Hemoglobin  11.9 g/dl   


 


Bedside Hematocrit  35 %   


 


Bedside Sodium  140 mEq/L   


 


Bedside Potassium  4.1 mEq/L   


 


Bedside Chloride  105 mEq/L   


 


Bedside Total CO2  24 mEq/l   


 


Bedside Blood Urea Nitrogen  17 mg/dl   


 


Bedside Creatinine  0.9 mg/dl   


 


Bedside Glucose (other)  85 mg/dl   


 


Bedside Ionized Calcium (Jhon)  1.14 mmol/l   


 


Prothrombin Time   10.0 SECONDS  


 


Prothromb Time International


Ratio 


  


  1.0 


  


 


 


Hepatitis C Antibody Screen   NEG  


 


White Blood Count    5.95 K/uL 


 


Red Blood Count    4.37 M/uL 


 


Hemoglobin    13.3 g/dL 


 


Hematocrit    41.1 % 


 


Mean Corpuscular Volume    94.1 fL 


 


Mean Corpuscular Hemoglobin    30.4 pg 


 


Mean Corpuscular Hemoglobin


Concent 


  


  


  32.4 g/dl 


 


 


RDW Standard Deviation    48.4 fL 


 


RDW Coefficient of Variation    14.1 % 


 


Platelet Count    186 K/uL 


 


Mean Platelet Volume    11.8 fL 


 


Magnesium Level    2.1 mg/dl 


 


25-Hydroxy Vitamin D Total    32.1 ng/ml 


 


Thyroid Stimulating Hormone


(TSH) 


  


  


  5.960 uIu/ml 


 


 


Test


  7/28/18


13:17 


  


  


 











Assessment & Plan


Assessment and Plan


This is a 68 year old female with a past medical history of vertigo, migraines, 

COPD, tobacco use disorder, depression/anxiety, bipolar disorder, hx. of EtOH 

abuse in remission, osteoporosis, HTN, HLD, hypothyroidism, fibromyalgia - 

presents after a fall.








Multiple L Sided Rib Fractures


Mechanical Fall


Hx. of Vertigo


- patient presents with a fall


- she has a hx. of recurrent concussions, hx. of vertigo/migraine symptoms





- pain about 6 from 10 after pain medications are given


- O2 supplement now down to 2.5


 denies dyspnea, cough, sputum


- add PRN Norco


  continue Toradol, Morphine PRN, IV fluids and Colace


  Incentive spirometry encouraged





Left shoulder pain


shoulder and humerus xray ordered





Sinus Bradycardia


check Thyroid Function test


monitor in Tele


hold Verapamil





LBBB


chronic





Loosening of L4 pedicle screw 


s/p Lumbar Spine Surgery in 2013


will consult Dr. Kelli Cook TSH


check free t4





COPD/Tobacco Use Disorder


- not in exacerbation


- continue Advair





Osteoporosis


- receives Alendronate as outpatient


- continue vitamin D and calcium


- check vitamin D level





Depression/Anxiety/Bipolar


- continue home medications





DVT ppx


- subq heparin





Disposition


anticipate transition to Rehab


Current Inpatient Medications:





Current Inpatient Medications








 Medications


  (Trade)  Dose


 Ordered  Sig/Kennedy


 Route  Start Time


 Stop Time Status Last Admin


Dose Admin


 


 Ioversol


  (Optiray 320)  111 ml  UD  PRN


 IV  7/27/18 15:00


 7/31/18 14:59   


 


 


 Morphine Sulfate


  (MoRPHine


 SULFATE INJ)  4 mg  Q1H  PRN


 IV  7/27/18 16:00


 8/10/18 15:59  7/27/18 17:19


4 MG


 


 Heparin Sodium


  (Porcine)


  (Heparin Sq 5000


 Unit/0.5ml)  5,000 unit  Q8H


 SQ  7/27/18 22:00


 8/26/18 21:59  7/27/18 21:25


5,000 UNIT


 


 Acetaminophen


  (Tylenol Tab)  650 mg  Q4H  PRN


 PO  7/27/18 16:30


 8/26/18 16:29   


 


 


 Ondansetron HCl


  (Zofran Inj)  4 mg  Q6H  PRN


 IV  7/27/18 16:30


 8/26/18 16:29   


 


 


 Aspirin


  (Ecotrin Tab)  81 mg  1400


 PO  7/28/18 14:00


 8/27/18 13:59   


 


 


 Calcium/Vitamin D


  (Caltrate Plus


 Tab)  2 tab  1400


 PO  7/28/18 14:00


 8/27/18 13:59   


 


 


 Cyanocobalamin


  (Vitamin B-12


 Tab)  1,000 mcg  QAM


 PO  7/28/18 08:00


 8/27/18 08:59  7/28/18 09:04


1,000 MCG


 


 Divalproex Sodium


  (Depakote Delay


 Rel Tab)  250 mg  HS


 PO  7/27/18 21:00


 8/26/18 20:59  7/27/18 21:20


250 MG


 


 Docusate Sodium


  (coLACE CAP)  100 mg  QPM


 PO  7/27/18 21:00


 8/26/18 20:59  7/27/18 21:20


100 MG


 


 Salmeterol


 Xinafoate/


 Fluticasone


  (Advair Diskus


 250/50 Inh)  1 puff  BID


 INH  7/27/18 20:00


 8/26/18 20:59  7/28/18 09:01


1 PUFF


 


 Lamotrigine


  (Lamictal Tab)  200 mg  BID


 PO  7/27/18 20:00


 8/26/18 20:59  7/28/18 09:02


200 MG


 


 Meclizine HCl


  (Antivert Tab)  25 mg  TID  PRN


 PO  7/27/18 16:30


 8/26/18 16:29   


 


 


 Methylphenidate


 HCl


  (Ritalin Tab)  10 mg  DAILY@1200


 PO  7/28/18 12:00


 8/11/18 11:59  7/28/18 11:50


10 MG


 


 Nitroglycerin


  (Nitrostat Tab)  0.3 mg  PRN  PRN


 UT  7/27/18 16:30


 8/26/18 16:29   


 


 


 Pregabalin


  (Lyrica Cap)  300 mg  BID


 PO  7/27/18 20:00


 8/26/18 20:59  7/28/18 09:03


300 MG


 


 Ranitidine HCl


  (zANTac TAB)  150 mg  BID


 PO  7/27/18 20:00


 8/26/18 20:59  7/28/18 09:04


150 MG


 


 Trazodone HCl


  (Desyrel Tab)  100 mg  HS


 PO  7/27/18 21:00


 8/26/18 20:59  7/27/18 21:19


100 MG


 


 Verapamil HCl


  (Calan-Sr Tab)  180 mg  QAM


 PO  7/28/18 08:00


 8/27/18 08:59   


 


 


 Albuterol


  (Ventolin Hfa


 Inhaler)  2 puffs  Q4H  PRN


 INH  7/27/18 16:30


 8/26/18 16:29   


 


 


 Methylphenidate


 HCl


  (Ritalin Tab)  20 mg  QAM


 PO  7/28/18 08:00


 8/27/18 08:59  7/28/18 09:03


20 MG


 


 Morphine Sulfate


  (MoRPHine


 SULFATE INJ)  4 mg  Q4H  PRN


 IV  7/27/18 16:30


 8/10/18 16:29  7/28/18 08:17


4 MG


 


 Ketorolac


 Tromethamine


  (Toradol Inj)  15 mg  Q6H  PRN


 IV.  7/27/18 16:30


 8/1/18 16:29  7/28/18 11:44


15 MG


 


 Sodium Chloride  1,000 ml @ 


 75 mls/hr  [id number]


 IV  7/27/18 19:15


 8/26/18 19:14  7/28/18 09:09


75 MLS/HR


 


 Albuterol/


 Ipratropium


  (Duoneb)  3 ml  Q4R  PRN


 INH  7/27/18 17:00


 8/26/18 16:59   


 


 


 Miscellaneous


  (Iv Fluids


 Completed)  1 ea  PRN  PRN


 N/A  7/27/18 17:30


 7/27/19 17:29   


 


 


 Acetaminophen/


 Hydrocodone Bitart


  (Norco 5/325 Tab)  1 tab  Q4H  PRN


 PO  7/28/18 13:30


 8/11/18 13:29 UNV

## 2018-07-29 VITALS
HEART RATE: 59 BPM | SYSTOLIC BLOOD PRESSURE: 132 MMHG | OXYGEN SATURATION: 91 % | DIASTOLIC BLOOD PRESSURE: 74 MMHG | TEMPERATURE: 98.6 F

## 2018-07-29 VITALS — OXYGEN SATURATION: 92 % | DIASTOLIC BLOOD PRESSURE: 59 MMHG | HEART RATE: 64 BPM | SYSTOLIC BLOOD PRESSURE: 124 MMHG

## 2018-07-29 VITALS
OXYGEN SATURATION: 91 % | SYSTOLIC BLOOD PRESSURE: 135 MMHG | TEMPERATURE: 98.6 F | DIASTOLIC BLOOD PRESSURE: 73 MMHG | HEART RATE: 75 BPM

## 2018-07-29 VITALS
OXYGEN SATURATION: 91 % | TEMPERATURE: 97.7 F | DIASTOLIC BLOOD PRESSURE: 75 MMHG | HEART RATE: 64 BPM | SYSTOLIC BLOOD PRESSURE: 163 MMHG

## 2018-07-29 VITALS — SYSTOLIC BLOOD PRESSURE: 129 MMHG | OXYGEN SATURATION: 90 % | DIASTOLIC BLOOD PRESSURE: 76 MMHG | HEART RATE: 68 BPM

## 2018-07-29 VITALS
TEMPERATURE: 98.06 F | SYSTOLIC BLOOD PRESSURE: 124 MMHG | HEART RATE: 64 BPM | OXYGEN SATURATION: 92 % | DIASTOLIC BLOOD PRESSURE: 59 MMHG

## 2018-07-29 LAB
BASOPHILS # BLD: 0.01 K/UL (ref 0–0.2)
BASOPHILS NFR BLD: 0.1 %
BUN SERPL-MCNC: 10 MG/DL (ref 7–18)
CALCIUM SERPL-MCNC: 8.7 MG/DL (ref 8.5–10.1)
CO2 SERPL-SCNC: 28 MMOL/L (ref 21–32)
CREAT SERPL-MCNC: 0.78 MG/DL (ref 0.6–1.2)
EOS ABS #: 0.06 K/UL (ref 0–0.5)
EOSINOPHIL NFR BLD AUTO: 173 K/UL (ref 130–400)
GLUCOSE SERPL-MCNC: 86 MG/DL (ref 70–99)
HCT VFR BLD CALC: 39 % (ref 37–47)
HGB BLD-MCNC: 13 G/DL (ref 12–16)
IG#: 0.02 K/UL (ref 0–0.02)
IMM GRANULOCYTES NFR BLD AUTO: 7 %
LYMPHOCYTES # BLD: 0.91 K/UL (ref 1.2–3.4)
MCH RBC QN AUTO: 30.7 PG (ref 25–34)
MCHC RBC AUTO-ENTMCNC: 33.3 G/DL (ref 32–36)
MCV RBC AUTO: 92 FL (ref 80–100)
MONO ABS #: 0.66 K/UL (ref 0.11–0.59)
MONOCYTES NFR BLD: 5.1 %
NEUT ABS #: 11.38 K/UL (ref 1.4–6.5)
NEUTROPHILS # BLD AUTO: 0.5 %
NEUTROPHILS NFR BLD AUTO: 87.1 %
PMV BLD AUTO: 11.8 FL (ref 7.4–10.4)
POTASSIUM SERPL-SCNC: 4.7 MMOL/L (ref 3.5–5.1)
RED CELL DISTRIBUTION WIDTH CV: 13.8 % (ref 11.5–14.5)
RED CELL DISTRIBUTION WIDTH SD: 46.8 FL (ref 36.4–46.3)
SODIUM SERPL-SCNC: 141 MMOL/L (ref 136–145)
WBC # BLD AUTO: 13.04 K/UL (ref 4.8–10.8)

## 2018-07-29 RX ADMIN — RANITIDINE SCH MG: 150 TABLET ORAL at 21:00

## 2018-07-29 RX ADMIN — DOCUSATE SODIUM SCH MG: 100 CAPSULE, LIQUID FILLED ORAL at 21:00

## 2018-07-29 RX ADMIN — DOCUSATE SODIUM SCH MG: 100 CAPSULE, LIQUID FILLED ORAL at 21:51

## 2018-07-29 RX ADMIN — HEPARIN SODIUM SCH UNIT: 10000 INJECTION, SOLUTION INTRAVENOUS; SUBCUTANEOUS at 22:00

## 2018-07-29 RX ADMIN — HEPARIN SODIUM SCH UNIT: 10000 INJECTION, SOLUTION INTRAVENOUS; SUBCUTANEOUS at 13:55

## 2018-07-29 RX ADMIN — FLUTICASONE PROPIONATE AND SALMETEROL SCH PUFF: 50; 250 POWDER RESPIRATORY (INHALATION) at 21:00

## 2018-07-29 RX ADMIN — ACETAMINOPHEN SCH MLS/HR: 10 INJECTION, SOLUTION INTRAVENOUS at 17:14

## 2018-07-29 RX ADMIN — DOXYCYCLINE SCH MLS/HR: 100 INJECTION, POWDER, LYOPHILIZED, FOR SOLUTION INTRAVENOUS at 15:11

## 2018-07-29 RX ADMIN — SODIUM CHLORIDE SCH MLS/HR: 900 INJECTION, SOLUTION INTRAVENOUS at 11:21

## 2018-07-29 RX ADMIN — Medication SCH MG: at 13:52

## 2018-07-29 RX ADMIN — TRAZODONE HYDROCHLORIDE SCH MG: 100 TABLET ORAL at 21:00

## 2018-07-29 RX ADMIN — HEPARIN SODIUM SCH UNIT: 10000 INJECTION, SOLUTION INTRAVENOUS; SUBCUTANEOUS at 21:52

## 2018-07-29 RX ADMIN — HYDROCODONE BITARTRATE AND ACETAMINOPHEN PRN TAB: 5; 325 TABLET ORAL at 07:49

## 2018-07-29 RX ADMIN — METHYLPHENIDATE HYDROCHLORIDE SCH MG: 10 TABLET ORAL at 08:00

## 2018-07-29 RX ADMIN — RANITIDINE SCH MG: 150 TABLET ORAL at 07:50

## 2018-07-29 RX ADMIN — FLUTICASONE PROPIONATE AND SALMETEROL SCH PUFF: 50; 250 POWDER RESPIRATORY (INHALATION) at 21:50

## 2018-07-29 RX ADMIN — KETOROLAC TROMETHAMINE PRN MG: 15 INJECTION INTRAMUSCULAR; INTRAVENOUS at 07:48

## 2018-07-29 RX ADMIN — DIVALPROEX SODIUM SCH MG: 250 TABLET, DELAYED RELEASE ORAL at 21:50

## 2018-07-29 RX ADMIN — HEPARIN SODIUM SCH UNIT: 10000 INJECTION, SOLUTION INTRAVENOUS; SUBCUTANEOUS at 06:09

## 2018-07-29 RX ADMIN — FLUTICASONE PROPIONATE AND SALMETEROL SCH PUFF: 50; 250 POWDER RESPIRATORY (INHALATION) at 07:51

## 2018-07-29 RX ADMIN — METHYLPHENIDATE HYDROCHLORIDE SCH MG: 10 TABLET ORAL at 11:37

## 2018-07-29 RX ADMIN — MORPHINE SULFATE PRN MG: 4 INJECTION, SOLUTION INTRAMUSCULAR; INTRAVENOUS at 00:43

## 2018-07-29 RX ADMIN — Medication SCH EA: at 21:55

## 2018-07-29 RX ADMIN — TRAZODONE HYDROCHLORIDE SCH MG: 100 TABLET ORAL at 21:51

## 2018-07-29 RX ADMIN — PREGABALIN SCH MG: 150 CAPSULE ORAL at 07:49

## 2018-07-29 RX ADMIN — PREGABALIN SCH MG: 150 CAPSULE ORAL at 21:50

## 2018-07-29 RX ADMIN — Medication SCH MCG: at 07:50

## 2018-07-29 RX ADMIN — RANITIDINE SCH MG: 150 TABLET ORAL at 21:50

## 2018-07-29 RX ADMIN — ACETAMINOPHEN SCH MLS/HR: 10 INJECTION, SOLUTION INTRAVENOUS at 23:40

## 2018-07-29 RX ADMIN — Medication SCH TAB: at 13:52

## 2018-07-29 NOTE — PROGRESS NOTE
Medicine Progress Note


Date & Time of Visit:


Jul 29, 2018 at 12:11.


Subjective


noted to be disoriented this morning


patient reported to RN that she is playing the lottery and has won money


she was then noted to be drowsy


on exam, patient appeared drowsy, able to open eyes some, follows commands but 

appears weak bilaterally, no lateralizing signs noted


narcan 1 dose ordered


patient became more awake, able to follow more commands, strength 5/5 and 

sensation 100% on all ext


   no facial asymmetry, tongue midline, speech at baseline


states left sided pain is about the same, denies dyspnea, palpitations, 

dizziness, nausea


no other symptoms





Objective





Last 8 Hrs








  Date Time  Temp Pulse Resp B/P (MAP) Pulse Ox O2 Delivery O2 Flow Rate FiO2


 


7/29/18 08:00      Nasal Cannula 2.0 


 


7/29/18 07:16 37.0 75 18 135/73 (93) 91 Nasal Cannula 2.0 


 


7/29/18 04:25 36.5 64 14 163/75 (104) 91 Nasal Cannula 2.0 








Physical Exam:





General- oriented x 3, not in distress, speaks in sentences 





Head-  atraumatic





Eyes-  anicteric





Neck- supple, no JVD





Lungs- clear breath sounds bilaterally. no rales/wheezes


(+) tenderness on the posterolateral ribs- left





Heart- regular rhythm; no murmur, normal rate





Abdomen- normal bowel sounds, soft, nontender





Extremities- no pretibial edema, no calf tenderness





Neuro- alert, oriented x 3;no gross focal neuro deficits





Skin- warm & dry


Laboratory Results:





Last 24 Hours








Test


  7/29/18


12:08











Assessment & Plan


Assessment and Plan


This is a 68 year old female with a past medical history of vertigo, migraines, 

COPD, tobacco use disorder, depression/anxiety, bipolar disorder, hx. of EtOH 

abuse in remission, osteoporosis, HTN, HLD, hypothyroidism, fibromyalgia - 

presents after a fall.








Multiple L Sided Rib Fractures


Mechanical Fall


Hx. of Vertigo


- patient presents with a fall


- she has a hx. of recurrent concussions, hx. of vertigo/migraine symptoms





- pain scale about the same


  d/c Morphine and Norco- no opioids for today due to drowsiness


  continue Toradol


  add Ofirmev, Lidoderm patch


 


- O2 supplement at 3L


 denies dyspnea, cough, sputum


  repeat CXR to ff up





- Incentive spirometry encouraged





Metabolic Encephalopathy


likely secondary to Morphine, Norco


- check CT head


  labs


  Neurocheck





Left shoulder pain


shoulder and humerus xray ordered: no fracture





Sinus Bradycardia


monitor in Tele


hold Verapamil





LBBB


chronic





Loosening of L4 pedicle screw 


s/p Lumbar Spine Surgery in 2013


will consult Dr. Pereira





High TSH


 free t4 normal





COPD/Tobacco Use Disorder


- not in exacerbation


- continue Advair





Osteoporosis


- receives Alendronate as outpatient


- continue vitamin D and calcium


- check vitamin D level





Depression/Anxiety/Bipolar


- continue home medications





DVT ppx


- subq heparin





Disposition


anticipate transition to Rehab


Current Inpatient Medications:





Current Inpatient Medications








 Medications


  (Trade)  Dose


 Ordered  Sig/Kennedy


 Route  Start Time


 Stop Time Status Last Admin


Dose Admin


 


 Ioversol


  (Optiray 320)  111 ml  UD  PRN


 IV  7/27/18 15:00


 7/31/18 14:59   


 


 


 Heparin Sodium


  (Porcine)


  (Heparin Sq 5000


 Unit/0.5ml)  5,000 unit  Q8H


 SQ  7/27/18 22:00


 8/26/18 21:59  7/29/18 06:09


5,000 UNIT


 


 Acetaminophen


  (Tylenol Tab)  650 mg  Q4H  PRN


 PO  7/27/18 16:30


 8/26/18 16:29   


 


 


 Ondansetron HCl


  (Zofran Inj)  4 mg  Q6H  PRN


 IV  7/27/18 16:30


 8/26/18 16:29   


 


 


 Aspirin


  (Ecotrin Tab)  81 mg  1400


 PO  7/28/18 14:00


 8/27/18 13:59  7/28/18 13:58


81 MG


 


 Calcium/Vitamin D


  (Caltrate Plus


 Tab)  2 tab  1400


 PO  7/28/18 14:00


 8/27/18 13:59  7/28/18 13:58


2 TAB


 


 Cyanocobalamin


  (Vitamin B-12


 Tab)  1,000 mcg  QAM


 PO  7/28/18 08:00


 8/27/18 08:59  7/29/18 07:50


1,000 MCG


 


 Divalproex Sodium


  (Depakote Delay


 Rel Tab)  250 mg  HS


 PO  7/27/18 21:00


 8/26/18 20:59  7/28/18 20:14


250 MG


 


 Docusate Sodium


  (coLACE CAP)  100 mg  QPM


 PO  7/27/18 21:00


 8/26/18 20:59  7/28/18 20:11


100 MG


 


 Salmeterol


 Xinafoate/


 Fluticasone


  (Advair Diskus


 250/50 Inh)  1 puff  BID


 INH  7/27/18 20:00


 8/26/18 20:59  7/29/18 07:51


1 PUFF


 


 Lamotrigine


  (Lamictal Tab)  200 mg  BID


 PO  7/27/18 20:00


 8/26/18 20:59  7/29/18 07:51


200 MG


 


 Meclizine HCl


  (Antivert Tab)  25 mg  TID  PRN


 PO  7/27/18 16:30


 8/26/18 16:29   


 


 


 Methylphenidate


 HCl


  (Ritalin Tab)  10 mg  DAILY@1200


 PO  7/28/18 12:00


 8/11/18 11:59  7/29/18 11:37


10 MG


 


 Nitroglycerin


  (Nitrostat Tab)  0.3 mg  PRN  PRN


 UT  7/27/18 16:30


 8/26/18 16:29   


 


 


 Pregabalin


  (Lyrica Cap)  300 mg  BID


 PO  7/27/18 20:00


 8/26/18 20:59  7/29/18 07:49


300 MG


 


 Ranitidine HCl


  (zANTac TAB)  150 mg  BID


 PO  7/27/18 20:00


 8/26/18 20:59  7/29/18 07:50


150 MG


 


 Trazodone HCl


  (Desyrel Tab)  100 mg  HS


 PO  7/27/18 21:00


 8/26/18 20:59  7/28/18 20:14


100 MG


 


 Albuterol


  (Ventolin Hfa


 Inhaler)  2 puffs  Q4H  PRN


 INH  7/27/18 16:30


 8/26/18 16:29   


 


 


 Methylphenidate


 HCl


  (Ritalin Tab)  20 mg  QAM


 PO  7/28/18 08:00


 8/27/18 08:59  7/29/18 08:00


20 MG


 


 Ketorolac


 Tromethamine


  (Toradol Inj)  15 mg  Q6H  PRN


 IV.  7/27/18 16:30


 8/1/18 16:29  7/29/18 07:48


15 MG


 


 Sodium Chloride  1,000 ml @ 


 75 mls/hr  [id number]


 IV  7/27/18 19:15


 8/26/18 19:14  7/29/18 11:21


75 MLS/HR


 


 Albuterol/


 Ipratropium


  (Duoneb)  3 ml  Q4R  PRN


 INH  7/27/18 17:00


 8/26/18 16:59   


 


 


 Miscellaneous


  (Iv Fluids


 Completed)  1 ea  PRN  PRN


 N/A  7/27/18 17:30


 7/27/19 17:29   


 


 


 Tramadol HCl


  (Ultram Tab)  50 mg  Q6H  PRN


 PO  7/29/18 11:30


 8/28/18 11:29

## 2018-07-29 NOTE — DIAGNOSTIC IMAGING REPORT
HEAD WITHOUT CONTRAST (CT)



CT DOSE: 1459.56 mGycm



HISTORY: Trauma. Mental status change.  r/o cva, bleed



TECHNIQUE: Multiaxial CT images of the head were performed without the use of

intravenous contrast.  A dose lowering technique was utilized adhering to the

principles of ALARA.





Comparison: 1/4/2017



Findings: The paranasal sinuses and mastoid air cells are clear. The calvarium

and skull base are intact. The ventricles and sulci are within normal limits.

There is no mass, hematoma, midline shift, or acute infarct.



Impression:

No acute intracranial abnormality. 











The above report was generated using voice recognition software.  It may contain

grammatical, syntax or spelling errors.







Electronically signed by:  Tay Vasquez M.D.

7/29/2018 12:54 PM



Dictated Date/Time:  7/29/2018 12:54 PM

## 2018-07-29 NOTE — DIAGNOSTIC IMAGING REPORT
CHEST ONE VIEW PORTABLE



CLINICAL HISTORY: follow up pain. Trauma.



COMPARISON STUDY:  7/27/2018



FINDINGS: Mild increase in consolidative and/or effusion-type change left lung

base. No evidence pneumothorax. Trace amount of atelectasis right base. 



IMPRESSION:  Mildly progressive consolidative infiltrate/atelectatic change left

base. Minimal atelectasis right base. 











The above report was generated using voice recognition software.  It may contain

grammatical, syntax or spelling errors.









Electronically signed by:  Tay Vasquez M.D.

7/29/2018 12:35 PM



Dictated Date/Time:  7/29/2018 12:34 PM

## 2018-07-30 VITALS
TEMPERATURE: 97.7 F | SYSTOLIC BLOOD PRESSURE: 138 MMHG | HEART RATE: 65 BPM | OXYGEN SATURATION: 90 % | DIASTOLIC BLOOD PRESSURE: 67 MMHG

## 2018-07-30 VITALS
SYSTOLIC BLOOD PRESSURE: 152 MMHG | HEART RATE: 65 BPM | TEMPERATURE: 98.06 F | OXYGEN SATURATION: 90 % | DIASTOLIC BLOOD PRESSURE: 78 MMHG

## 2018-07-30 VITALS
TEMPERATURE: 97.88 F | HEART RATE: 62 BPM | SYSTOLIC BLOOD PRESSURE: 142 MMHG | OXYGEN SATURATION: 92 % | DIASTOLIC BLOOD PRESSURE: 69 MMHG

## 2018-07-30 VITALS
TEMPERATURE: 97.34 F | DIASTOLIC BLOOD PRESSURE: 71 MMHG | OXYGEN SATURATION: 94 % | HEART RATE: 55 BPM | SYSTOLIC BLOOD PRESSURE: 128 MMHG

## 2018-07-30 VITALS — OXYGEN SATURATION: 94 %

## 2018-07-30 VITALS
HEART RATE: 66 BPM | DIASTOLIC BLOOD PRESSURE: 67 MMHG | OXYGEN SATURATION: 92 % | SYSTOLIC BLOOD PRESSURE: 139 MMHG | TEMPERATURE: 98.06 F

## 2018-07-30 VITALS
OXYGEN SATURATION: 91 % | DIASTOLIC BLOOD PRESSURE: 73 MMHG | HEART RATE: 67 BPM | SYSTOLIC BLOOD PRESSURE: 128 MMHG | TEMPERATURE: 97.52 F

## 2018-07-30 LAB
BASOPHILS # BLD: 0.01 K/UL (ref 0–0.2)
BASOPHILS NFR BLD: 0.1 %
BUN SERPL-MCNC: 9 MG/DL (ref 7–18)
CALCIUM SERPL-MCNC: 9 MG/DL (ref 8.5–10.1)
CO2 SERPL-SCNC: 23 MMOL/L (ref 21–32)
CREAT SERPL-MCNC: 0.72 MG/DL (ref 0.6–1.2)
EOS ABS #: 0.03 K/UL (ref 0–0.5)
EOSINOPHIL NFR BLD AUTO: 167 K/UL (ref 130–400)
GLUCOSE SERPL-MCNC: 85 MG/DL (ref 70–99)
HCT VFR BLD CALC: 37.7 % (ref 37–47)
HGB BLD-MCNC: 12.6 G/DL (ref 12–16)
IG#: 0.02 K/UL (ref 0–0.02)
IMM GRANULOCYTES NFR BLD AUTO: 5 %
LYMPHOCYTES # BLD: 0.64 K/UL (ref 1.2–3.4)
MCH RBC QN AUTO: 30.7 PG (ref 25–34)
MCHC RBC AUTO-ENTMCNC: 33.4 G/DL (ref 32–36)
MCV RBC AUTO: 91.7 FL (ref 80–100)
MONO ABS #: 0.79 K/UL (ref 0.11–0.59)
MONOCYTES NFR BLD: 6.2 %
NEUT ABS #: 11.32 K/UL (ref 1.4–6.5)
NEUTROPHILS # BLD AUTO: 0.2 %
NEUTROPHILS NFR BLD AUTO: 88.3 %
PMV BLD AUTO: 12.6 FL (ref 7.4–10.4)
POTASSIUM SERPL-SCNC: 3.7 MMOL/L (ref 3.5–5.1)
RED CELL DISTRIBUTION WIDTH CV: 13.8 % (ref 11.5–14.5)
RED CELL DISTRIBUTION WIDTH SD: 45.7 FL (ref 36.4–46.3)
SODIUM SERPL-SCNC: 139 MMOL/L (ref 136–145)
WBC # BLD AUTO: 12.81 K/UL (ref 4.8–10.8)

## 2018-07-30 RX ADMIN — KETOROLAC TROMETHAMINE PRN MG: 15 INJECTION INTRAMUSCULAR; INTRAVENOUS at 20:56

## 2018-07-30 RX ADMIN — HEPARIN SODIUM SCH UNIT: 10000 INJECTION, SOLUTION INTRAVENOUS; SUBCUTANEOUS at 14:00

## 2018-07-30 RX ADMIN — HEPARIN SODIUM SCH UNIT: 10000 INJECTION, SOLUTION INTRAVENOUS; SUBCUTANEOUS at 06:00

## 2018-07-30 RX ADMIN — ACETAMINOPHEN SCH MLS/HR: 10 INJECTION, SOLUTION INTRAVENOUS at 15:56

## 2018-07-30 RX ADMIN — ACETAMINOPHEN SCH MLS/HR: 10 INJECTION, SOLUTION INTRAVENOUS at 04:30

## 2018-07-30 RX ADMIN — FLUTICASONE PROPIONATE AND SALMETEROL SCH PUFF: 50; 250 POWDER RESPIRATORY (INHALATION) at 20:52

## 2018-07-30 RX ADMIN — PREGABALIN SCH MG: 150 CAPSULE ORAL at 20:53

## 2018-07-30 RX ADMIN — Medication SCH MCG: at 07:30

## 2018-07-30 RX ADMIN — METHYLPHENIDATE HYDROCHLORIDE SCH MG: 10 TABLET ORAL at 12:16

## 2018-07-30 RX ADMIN — FLUTICASONE PROPIONATE AND SALMETEROL SCH PUFF: 50; 250 POWDER RESPIRATORY (INHALATION) at 07:30

## 2018-07-30 RX ADMIN — NICOTINE SCH PATCH: 7 PATCH, EXTENDED RELEASE TRANSDERMAL at 09:48

## 2018-07-30 RX ADMIN — PREGABALIN SCH MG: 150 CAPSULE ORAL at 07:29

## 2018-07-30 RX ADMIN — SODIUM CHLORIDE SCH MLS/HR: 900 INJECTION, SOLUTION INTRAVENOUS at 01:18

## 2018-07-30 RX ADMIN — ACETAMINOPHEN SCH MLS/HR: 10 INJECTION, SOLUTION INTRAVENOUS at 22:42

## 2018-07-30 RX ADMIN — DOCUSATE SODIUM SCH MG: 100 CAPSULE, LIQUID FILLED ORAL at 20:54

## 2018-07-30 RX ADMIN — KETOROLAC TROMETHAMINE PRN MG: 15 INJECTION INTRAMUSCULAR; INTRAVENOUS at 07:29

## 2018-07-30 RX ADMIN — METHYLPHENIDATE HYDROCHLORIDE SCH MG: 10 TABLET ORAL at 07:29

## 2018-07-30 RX ADMIN — HEPARIN SODIUM SCH UNIT: 10000 INJECTION, SOLUTION INTRAVENOUS; SUBCUTANEOUS at 22:00

## 2018-07-30 RX ADMIN — DIVALPROEX SODIUM SCH MG: 250 TABLET, DELAYED RELEASE ORAL at 20:54

## 2018-07-30 RX ADMIN — KETOROLAC TROMETHAMINE PRN MG: 15 INJECTION INTRAMUSCULAR; INTRAVENOUS at 14:06

## 2018-07-30 RX ADMIN — Medication SCH TAB: at 12:17

## 2018-07-30 RX ADMIN — ACETAMINOPHEN SCH MLS/HR: 10 INJECTION, SOLUTION INTRAVENOUS at 09:49

## 2018-07-30 RX ADMIN — RANITIDINE SCH MG: 150 TABLET ORAL at 20:52

## 2018-07-30 RX ADMIN — SODIUM CHLORIDE SCH MLS/HR: 900 INJECTION, SOLUTION INTRAVENOUS at 14:03

## 2018-07-30 RX ADMIN — Medication SCH EA: at 20:55

## 2018-07-30 RX ADMIN — DOXYCYCLINE SCH MLS/HR: 100 INJECTION, POWDER, LYOPHILIZED, FOR SOLUTION INTRAVENOUS at 01:20

## 2018-07-30 RX ADMIN — RANITIDINE SCH MG: 150 TABLET ORAL at 07:29

## 2018-07-30 RX ADMIN — Medication SCH MG: at 12:16

## 2018-07-30 RX ADMIN — DOXYCYCLINE SCH MLS/HR: 100 INJECTION, POWDER, LYOPHILIZED, FOR SOLUTION INTRAVENOUS at 14:03

## 2018-07-30 RX ADMIN — LIDOCAINE SCH PATCH: 50 PATCH CUTANEOUS at 07:31

## 2018-07-30 RX ADMIN — Medication SCH TAB: at 12:20

## 2018-07-30 NOTE — NEUROLOGY PROGRESS NOTES
Neurology Progress Note


Date of Service


Jul 30, 2018.





Subjective


Patient has left-sided rib cage pain of a severe nature.  She has no headache.





She is upset because "no on is paying any attention to me and no one is giving 

me any pain medicines"


Patient apparently received Ultram this morning and then took a nap with no 

complaints of pain. She then had her bed changed with fresh linens and clothing 

and has done well with that.  She is now sitting up in bed attempting to eat.





Objective











  Date Time  Temp Pulse Resp B/P (MAP) Pulse Ox O2 Delivery O2 Flow Rate FiO2


 


7/30/18 08:00      Nasal Cannula 3.0 


 


7/30/18 07:00 36.5 65 24 138/67 (90) 90 Nasal Cannula 5.0 


 


7/30/18 03:20 36.4 67 18 128/73 (91) 91 Nasal Cannula 5.0 


 


7/29/18 22:59 37.0 59 18 132/74 (93) 91 Nasal Cannula 5.0 


 


7/29/18 20:00      Oxymask 3.0 


 


7/29/18 19:52  68 18 129/76 (93) 90 Nasal Cannula 4.0 


 


7/29/18 16:02      Nasal Cannula 3.0 





      Mask  


 


7/29/18 15:36 36.7 64 19 124/59 (80) 92 Nasal Cannula 3.0 


 


7/29/18 12:57  64 20 124/59 (80) 92 Oxymask 3.0 











Last 24 Hours








Test


  7/29/18


12:08 7/30/18


06:09 7/30/18


10:06


 


White Blood Count 13.04 K/uL  12.81 K/uL  


 


Red Blood Count 4.24 M/uL  4.11 M/uL  


 


Hemoglobin 13.0 g/dL  12.6 g/dL  


 


Hematocrit 39.0 %  37.7 %  


 


Mean Corpuscular Volume 92.0 fL  91.7 fL  


 


Mean Corpuscular Hemoglobin 30.7 pg  30.7 pg  


 


Mean Corpuscular Hemoglobin


Concent 33.3 g/dl 


  33.4 g/dl 


  


 


 


Platelet Count 173 K/uL  167 K/uL  


 


Mean Platelet Volume 11.8 fL  12.6 fL  


 


Neutrophils (%) (Auto) 87.1 %  88.3 %  


 


Lymphocytes (%) (Auto) 7.0 %  5.0 %  


 


Monocytes (%) (Auto) 5.1 %  6.2 %  


 


Eosinophils (%) (Auto) 0.5 %  0.2 %  


 


Basophils (%) (Auto) 0.1 %  0.1 %  


 


Neutrophils # (Auto) 11.38 K/uL  11.32 K/uL  


 


Lymphocytes # (Auto) 0.91 K/uL  0.64 K/uL  


 


Monocytes # (Auto) 0.66 K/uL  0.79 K/uL  


 


Eosinophils # (Auto) 0.06 K/uL  0.03 K/uL  


 


Basophils # (Auto) 0.01 K/uL  0.01 K/uL  


 


RDW Standard Deviation 46.8 fL  45.7 fL  


 


RDW Coefficient of Variation 13.8 %  13.8 %  


 


Immature Granulocyte % (Auto) 0.2 %  0.2 %  


 


Immature Granulocyte # (Auto) 0.02 K/uL  0.02 K/uL  


 


Sodium Level 141 mmol/L  139 mmol/L  


 


Potassium Level 4.7 mmol/L  3.7 mmol/L  


 


Chloride Level 107 mmol/L  106 mmol/L  


 


Carbon Dioxide Level 28 mmol/L  23 mmol/L  


 


Anion Gap 6.0 mmol/L  10.0 mmol/L  


 


Blood Urea Nitrogen 10 mg/dl  9 mg/dl  


 


Creatinine 0.78 mg/dl  0.72 mg/dl  


 


Est Creatinine Clear Calc


Drug Dose 58.0 ml/min 


  62.8 ml/min 


  


 


 


Estimated GFR (


American) 89.3 


  98.3 


  


 


 


Estimated GFR (Non-


American 77.0 


  84.9 


  


 


 


BUN/Creatinine Ratio 13.1  12.0  


 


Random Glucose 86 mg/dl  85 mg/dl  


 


Calcium Level 8.7 mg/dl  9.0 mg/dl  


 


Magnesium Level 1.9 mg/dl  1.7 mg/dl  


 


Valproic Acid (Depakene) Level   41 mcg/ml 








Exam:


She is awake and alert although she is somewhat lethargic.  Speech is without 

specific aphasia or dysarthria.  Her memory is poor and she does not know the 

day, date, month, or year.  She gets angry when she cannot answer questions 

posed to her.


She has some ataxia of the limbs with trying to eat.  Strength is symmetrical 

in the limbs.  Extraocular eye muscles are intact without nystagmus.  There is 

no facial droop.





Current Inpatient Medications








 Medications


  (Trade)  Dose


 Ordered  Sig/Kennedy


 Route  Start Time


 Stop Time Status Last Admin


Dose Admin


 


 Ioversol


  (Optiray 320)  111 ml  UD  PRN


 IV  7/27/18 15:00


 7/31/18 14:59   


 


 


 Heparin Sodium


  (Porcine)


  (Heparin Sq 5000


 Unit/0.5ml)  5,000 unit  Q8H


 SQ  7/27/18 22:00


 8/26/18 21:59  7/29/18 13:55


5,000 UNIT


 


 Acetaminophen


  (Tylenol Tab)  650 mg  Q4H  PRN


 PO  7/27/18 16:30


 8/26/18 16:29   


 


 


 Ondansetron HCl


  (Zofran Inj)  4 mg  Q6H  PRN


 IV  7/27/18 16:30


 8/26/18 16:29   


 


 


 Aspirin


  (Ecotrin Tab)  81 mg  1400


 PO  7/28/18 14:00


 8/27/18 13:59  7/28/18 13:58


81 MG


 


 Calcium/Vitamin D


  (Caltrate Plus


 Tab)  2 tab  1400


 PO  7/28/18 14:00


 8/27/18 13:59  7/28/18 13:58


2 TAB


 


 Cyanocobalamin


  (Vitamin B-12


 Tab)  1,000 mcg  QAM


 PO  7/28/18 08:00


 8/27/18 08:59  7/30/18 07:30


1,000 MCG


 


 Divalproex Sodium


  (Depakote Delay


 Rel Tab)  250 mg  HS


 PO  7/27/18 21:00


 8/26/18 20:59  7/29/18 21:50


250 MG


 


 Docusate Sodium


  (coLACE CAP)  100 mg  QPM


 PO  7/27/18 21:00


 8/26/18 20:59  7/28/18 20:11


100 MG


 


 Salmeterol


 Xinafoate/


 Fluticasone


  (Advair Diskus


 250/50 Inh)  1 puff  BID


 INH  7/27/18 20:00


 8/26/18 20:59  7/30/18 07:30


1 PUFF


 


 Lamotrigine


  (Lamictal Tab)  200 mg  BID


 PO  7/27/18 20:00


 8/26/18 20:59  7/30/18 07:30


200 MG


 


 Meclizine HCl


  (Antivert Tab)  25 mg  TID  PRN


 PO  7/27/18 16:30


 8/26/18 16:29   


 


 


 Methylphenidate


 HCl


  (Ritalin Tab)  10 mg  DAILY@1200


 PO  7/28/18 12:00


 8/11/18 11:59  7/29/18 11:37


10 MG


 


 Nitroglycerin


  (Nitrostat Tab)  0.3 mg  PRN  PRN


 UT  7/27/18 16:30


 8/26/18 16:29   


 


 


 Pregabalin


  (Lyrica Cap)  300 mg  BID


 PO  7/27/18 20:00


 8/26/18 20:59  7/30/18 07:29


300 MG


 


 Ranitidine HCl


  (zANTac TAB)  150 mg  BID


 PO  7/27/18 20:00


 8/26/18 20:59  7/30/18 07:29


150 MG


 


 Trazodone HCl


  (Desyrel Tab)  100 mg  HS


 PO  7/27/18 21:00


 8/26/18 20:59  7/28/18 20:14


100 MG


 


 Albuterol


  (Ventolin Hfa


 Inhaler)  2 puffs  Q4H  PRN


 INH  7/27/18 16:30


 8/26/18 16:29   


 


 


 Methylphenidate


 HCl


  (Ritalin Tab)  20 mg  QAM


 PO  7/28/18 08:00


 8/27/18 08:59  7/30/18 07:29


20 MG


 


 Ketorolac


 Tromethamine


  (Toradol Inj)  15 mg  Q6H  PRN


 IV.  7/27/18 16:30


 8/1/18 16:29  7/30/18 07:29


15 MG


 


 Sodium Chloride  1,000 ml @ 


 75 mls/hr  [id number]


 IV  7/27/18 19:15


 8/26/18 19:14  7/30/18 01:18


75 MLS/HR


 


 Albuterol/


 Ipratropium


  (Duoneb)  3 ml  Q4R  PRN


 INH  7/27/18 17:00


 8/26/18 16:59   


 


 


 Miscellaneous


  (Iv Fluids


 Completed)  1 ea  PRN  PRN


 N/A  7/27/18 17:30


 7/27/19 17:29   


 


 


 Acetaminophen 650


 mg/Empty Bag  65 ml @ 


 260 mls/hr  Q6H


 IV  7/29/18 16:30


 8/28/18 16:29  7/30/18 09:49


260 MLS/HR


 


 Lidocaine


  (Lidoderm Patch


 5%)  1 patch  QAM


 TD  7/30/18 09:00


 8/29/18 08:59  7/30/18 07:31


1 PATCH


 


 Miscellaneous


  (Remove Lidoderm


 Patch)  1 ea  DAILY@21


 N/A  7/29/18 21:00


 8/28/18 20:59  7/29/18 21:55


1 EA


 


 Doxycycline


 Hyclate 100 mg/


 Dextrose  110 ml @ 


 50 mls/hr  Q12H


 IV  7/29/18 14:00


 8/5/18 13:59  7/30/18 01:20


50 MLS/HR


 


 Nicotine


  (Nicoderm Cq


 14MG Patch)  1 patch  QAM


 TD  7/30/18 09:00


 8/29/18 08:59  7/30/18 09:48


1 PATCH


 


 Miscellaneous


  (Remove Nicoderm


 Patch)  1 ea  HS


 N/A  7/30/18 21:00


 8/29/18 20:59   


 











Impression


1. Gait disturbance





This is multifactorial and is likely predominantly due to her polyneuropathy.  

Polyneuropathy is idiopathic although I suspect the longstanding alcohol abuse 

has contributed.


She has vascular dementia and small vessel ischemia can lead to balance 

problems as well


She has had lumbar spine surgery and has chronic pain.





2.  Vascular dementia, mild, and stable





3. Severe depression and bipolar disorder on multiple medications.





4. History of migraine headaches, improved and stable.





5. History of concussions with falls within the last year and a she seems to 

have recovered from any symptomatology from these.





6. Confusion and lethargy.





This occurred more acutely after admission.  I 1st saw her, she was not as 

confused as she is today.  However, she is less sleepy and confused than she 

was yesterday morning.


I believe her pain medications/narcotics given to her for ribcage pain have 

made her worse.  She has an underlying dementia in any medication like this 

will likely make her more confused


I believe she is a little better this morning because morphine was 

discontinued.  It will take several days for this to get out of her system and 

get back to her baseline (dementia and depression).





Plan


1. I see no need for additional neurologic testing currently.





2.  Continue physical and occupational therapy.  Consider [facility] 

rehabilitation stay if qualifies.





She may need a 4 wheeled walker instead of a cane for support.





3. Keep off narcotics, as you are doing.





Dr. Simental will follow the patient as an outpatient.








Overall, I spent a total of 25 minutes with this case including records review, 

evaluation the patient directly at bedside, and discussion of the case with 

clinical staff and the patient regarding differential diagnosis and treatment 

options.

## 2018-07-31 VITALS
HEART RATE: 75 BPM | DIASTOLIC BLOOD PRESSURE: 79 MMHG | OXYGEN SATURATION: 85 % | TEMPERATURE: 98.06 F | SYSTOLIC BLOOD PRESSURE: 162 MMHG

## 2018-07-31 VITALS
HEART RATE: 63 BPM | SYSTOLIC BLOOD PRESSURE: 148 MMHG | DIASTOLIC BLOOD PRESSURE: 75 MMHG | TEMPERATURE: 97.7 F | OXYGEN SATURATION: 93 %

## 2018-07-31 VITALS
SYSTOLIC BLOOD PRESSURE: 149 MMHG | DIASTOLIC BLOOD PRESSURE: 75 MMHG | OXYGEN SATURATION: 91 % | TEMPERATURE: 97.52 F | HEART RATE: 84 BPM

## 2018-07-31 VITALS
TEMPERATURE: 97.88 F | SYSTOLIC BLOOD PRESSURE: 102 MMHG | OXYGEN SATURATION: 93 % | HEART RATE: 61 BPM | DIASTOLIC BLOOD PRESSURE: 65 MMHG

## 2018-07-31 VITALS — OXYGEN SATURATION: 96 % | HEART RATE: 61 BPM

## 2018-07-31 VITALS
HEART RATE: 69 BPM | DIASTOLIC BLOOD PRESSURE: 69 MMHG | SYSTOLIC BLOOD PRESSURE: 125 MMHG | OXYGEN SATURATION: 92 % | TEMPERATURE: 97.88 F

## 2018-07-31 VITALS
HEART RATE: 69 BPM | OXYGEN SATURATION: 88 % | DIASTOLIC BLOOD PRESSURE: 78 MMHG | TEMPERATURE: 97.7 F | SYSTOLIC BLOOD PRESSURE: 137 MMHG

## 2018-07-31 VITALS — OXYGEN SATURATION: 94 %

## 2018-07-31 LAB
BASOPHILS # BLD: 0.02 K/UL (ref 0–0.2)
BASOPHILS NFR BLD: 0.2 %
BUN SERPL-MCNC: 10 MG/DL (ref 7–18)
CALCIUM SERPL-MCNC: 8.2 MG/DL (ref 8.5–10.1)
CO2 SERPL-SCNC: 20 MMOL/L (ref 21–32)
CREAT SERPL-MCNC: 0.63 MG/DL (ref 0.6–1.2)
EOS ABS #: 0.05 K/UL (ref 0–0.5)
EOSINOPHIL NFR BLD AUTO: 162 K/UL (ref 130–400)
GLUCOSE SERPL-MCNC: 88 MG/DL (ref 70–99)
HCT VFR BLD CALC: 34.2 % (ref 37–47)
HGB BLD-MCNC: 11.5 G/DL (ref 12–16)
IG#: 0.02 K/UL (ref 0–0.02)
IMM GRANULOCYTES NFR BLD AUTO: 6.8 %
LYMPHOCYTES # BLD: 0.67 K/UL (ref 1.2–3.4)
MCH RBC QN AUTO: 30.5 PG (ref 25–34)
MCHC RBC AUTO-ENTMCNC: 33.6 G/DL (ref 32–36)
MCV RBC AUTO: 90.7 FL (ref 80–100)
MONO ABS #: 0.7 K/UL (ref 0.11–0.59)
MONOCYTES NFR BLD: 7.1 %
NEUT ABS #: 8.44 K/UL (ref 1.4–6.5)
NEUTROPHILS # BLD AUTO: 0.5 %
NEUTROPHILS NFR BLD AUTO: 85.2 %
PMV BLD AUTO: 12 FL (ref 7.4–10.4)
POTASSIUM SERPL-SCNC: 3.8 MMOL/L (ref 3.5–5.1)
RED CELL DISTRIBUTION WIDTH CV: 13.9 % (ref 11.5–14.5)
RED CELL DISTRIBUTION WIDTH SD: 45.7 FL (ref 36.4–46.3)
SODIUM SERPL-SCNC: 140 MMOL/L (ref 136–145)
WBC # BLD AUTO: 9.9 K/UL (ref 4.8–10.8)

## 2018-07-31 RX ADMIN — DIVALPROEX SODIUM SCH MG: 250 TABLET, DELAYED RELEASE ORAL at 19:30

## 2018-07-31 RX ADMIN — Medication SCH MG: at 13:28

## 2018-07-31 RX ADMIN — KETOROLAC TROMETHAMINE PRN MG: 15 INJECTION INTRAMUSCULAR; INTRAVENOUS at 02:16

## 2018-07-31 RX ADMIN — DOXYCYCLINE SCH MLS/HR: 100 INJECTION, POWDER, LYOPHILIZED, FOR SOLUTION INTRAVENOUS at 02:15

## 2018-07-31 RX ADMIN — Medication SCH MCG: at 08:35

## 2018-07-31 RX ADMIN — HEPARIN SODIUM SCH UNIT: 10000 INJECTION, SOLUTION INTRAVENOUS; SUBCUTANEOUS at 05:54

## 2018-07-31 RX ADMIN — Medication SCH TAB: at 13:28

## 2018-07-31 RX ADMIN — HEPARIN SODIUM SCH UNIT: 10000 INJECTION, SOLUTION INTRAVENOUS; SUBCUTANEOUS at 13:34

## 2018-07-31 RX ADMIN — ACETAMINOPHEN SCH MLS/HR: 10 INJECTION, SOLUTION INTRAVENOUS at 22:31

## 2018-07-31 RX ADMIN — FLUTICASONE PROPIONATE AND SALMETEROL SCH PUFF: 50; 250 POWDER RESPIRATORY (INHALATION) at 08:36

## 2018-07-31 RX ADMIN — NICOTINE SCH PATCH: 7 PATCH, EXTENDED RELEASE TRANSDERMAL at 08:36

## 2018-07-31 RX ADMIN — ACETAMINOPHEN SCH MLS/HR: 10 INJECTION, SOLUTION INTRAVENOUS at 05:21

## 2018-07-31 RX ADMIN — ACETAMINOPHEN SCH MLS/HR: 10 INJECTION, SOLUTION INTRAVENOUS at 10:30

## 2018-07-31 RX ADMIN — SODIUM CHLORIDE SCH MLS/HR: 900 INJECTION, SOLUTION INTRAVENOUS at 08:35

## 2018-07-31 RX ADMIN — DOCUSATE SODIUM SCH MG: 100 CAPSULE, LIQUID FILLED ORAL at 19:30

## 2018-07-31 RX ADMIN — LIDOCAINE SCH PATCH: 50 PATCH CUTANEOUS at 08:36

## 2018-07-31 RX ADMIN — RANITIDINE SCH MG: 150 TABLET ORAL at 19:30

## 2018-07-31 RX ADMIN — TRAZODONE HYDROCHLORIDE SCH MG: 100 TABLET ORAL at 19:29

## 2018-07-31 RX ADMIN — DOXYCYCLINE SCH MLS/HR: 100 INJECTION, POWDER, LYOPHILIZED, FOR SOLUTION INTRAVENOUS at 13:28

## 2018-07-31 RX ADMIN — RANITIDINE SCH MG: 150 TABLET ORAL at 08:35

## 2018-07-31 RX ADMIN — KETOROLAC TROMETHAMINE PRN MG: 15 INJECTION INTRAMUSCULAR; INTRAVENOUS at 08:47

## 2018-07-31 RX ADMIN — ACETAMINOPHEN SCH MLS/HR: 10 INJECTION, SOLUTION INTRAVENOUS at 16:11

## 2018-07-31 RX ADMIN — PREGABALIN SCH MG: 150 CAPSULE ORAL at 19:36

## 2018-07-31 RX ADMIN — HEPARIN SODIUM SCH UNIT: 10000 INJECTION, SOLUTION INTRAVENOUS; SUBCUTANEOUS at 22:34

## 2018-07-31 RX ADMIN — KETOROLAC TROMETHAMINE PRN MG: 15 INJECTION INTRAMUSCULAR; INTRAVENOUS at 14:53

## 2018-07-31 RX ADMIN — SODIUM CHLORIDE SCH MLS/HR: 900 INJECTION, SOLUTION INTRAVENOUS at 16:11

## 2018-07-31 RX ADMIN — FLUTICASONE PROPIONATE AND SALMETEROL SCH PUFF: 50; 250 POWDER RESPIRATORY (INHALATION) at 19:31

## 2018-07-31 RX ADMIN — PREGABALIN SCH MG: 150 CAPSULE ORAL at 08:40

## 2018-07-31 RX ADMIN — Medication SCH EA: at 19:27

## 2018-07-31 NOTE — PROGRESS NOTE
Medicine Progress Note


Date & Time of Visit:


Jul 31, 2018 at 09:10.


Subjective


Seemingly in bed, sleeping but easily awakened


Appears weak but patient today is oriented 3, answers questions appropriately


States that left-sided rib pain is still significant but improving


Remains on 2 L of O2 supplement, has occasional cough


Denies other symptoms





Objective





Last 8 Hrs








  Date Time  Temp Pulse Resp B/P (MAP) Pulse Ox O2 Delivery O2 Flow Rate FiO2


 


7/31/18 09:02  61 20  96 Nasal Cannula 2.0 


 


7/31/18 07:28 36.5 63 22 148/75 (99) 93 Nasal Cannula 3.0 


 


7/31/18 03:44 36.4 84 20 149/75 (99) 91 Nasal Cannula 3.5 








Physical Exam:





General- oriented x 3, not in distress, speaks in sentences 





Eyes-  anicteric





Neck- no JVD





Lungs-positive rales on the left base, no wheezing, clear in the right


(+) tenderness on the posterolateral ribs- left





Heart- regular rhythm; no murmur, normal rate





Abdomen- normal bowel sounds, soft, nontender





Extremities- no pretibial edema, no calf tenderness





Neuro- alert, oriented x 3;no gross focal neuro deficits





Skin- warm & dry


Laboratory Results:





Last 24 Hours








Test


  7/30/18


10:06 7/31/18


06:07


 


Valproic Acid (Depakene) Level 41 mcg/ml  


 


White Blood Count  9.90 K/uL 


 


Red Blood Count  3.77 M/uL 


 


Hemoglobin  11.5 g/dL 


 


Hematocrit  34.2 % 


 


Mean Corpuscular Volume  90.7 fL 


 


Mean Corpuscular Hemoglobin  30.5 pg 


 


Mean Corpuscular Hemoglobin


Concent 


  33.6 g/dl 


 


 


Platelet Count  162 K/uL 


 


Mean Platelet Volume  12.0 fL 


 


Neutrophils (%) (Auto)  85.2 % 


 


Lymphocytes (%) (Auto)  6.8 % 


 


Monocytes (%) (Auto)  7.1 % 


 


Eosinophils (%) (Auto)  0.5 % 


 


Basophils (%) (Auto)  0.2 % 


 


Neutrophils # (Auto)  8.44 K/uL 


 


Lymphocytes # (Auto)  0.67 K/uL 


 


Monocytes # (Auto)  0.70 K/uL 


 


Eosinophils # (Auto)  0.05 K/uL 


 


Basophils # (Auto)  0.02 K/uL 


 


RDW Standard Deviation  45.7 fL 


 


RDW Coefficient of Variation  13.9 % 


 


Immature Granulocyte % (Auto)  0.2 % 


 


Immature Granulocyte # (Auto)  0.02 K/uL 


 


Sodium Level  140 mmol/L 


 


Potassium Level  3.8 mmol/L 


 


Chloride Level  109 mmol/L 


 


Carbon Dioxide Level  20 mmol/L 


 


Anion Gap  11.0 mmol/L 


 


Blood Urea Nitrogen  10 mg/dl 


 


Creatinine  0.63 mg/dl 


 


Est Creatinine Clear Calc


Drug Dose 


  71.8 ml/min 


 


 


Estimated GFR (


American) 


  105.3 


 


 


Estimated GFR (Non-


American 


  90.9 


 


 


BUN/Creatinine Ratio  15.4 


 


Random Glucose  88 mg/dl 


 


Calcium Level  8.2 mg/dl 


 


Magnesium Level  1.8 mg/dl 











Assessment & Plan


Assessment and Plan


This is a 68 year old female with a past medical history of vertigo, migraines, 

COPD, tobacco use disorder, depression/anxiety, bipolar disorder, hx. of EtOH 

abuse in remission, osteoporosis, HTN, HLD, hypothyroidism, fibromyalgia - 

presents after a fall.








Multiple L Sided Rib Fractures


Mechanical Fall 


Hx. of Vertigo


- patient presents with a fall


- she has a hx. of recurrent concussions, hx. of vertigo/migraine symptoms





- pain scale slightly improved


  d/c Morphine and Norco- no opioids due to drowsiness


  continue Toradol, Ofirmev, Lidoderm patch


 


- O2 supplement at 3L


 denies dyspnea, cough, sputum





- Incentive spirometry encouraged


Will need rehab placement





CT head negative


Risk factor left rib fractures


On doxycycline IV twice daily


Monitor





Metabolic Encephalopathy


likely secondary to Morphine, Norco


-CT head no acute process


-Patient became drowsy on hospital day #2


Patient received 3 doses of morphine and 1 dose of Norco, then became confused


Narcan ordered, drowsiness improved


-Patient became very agitated combative hospital day #3


Record Haldol





Also on multiple psych meds, discussed with Dr. Hilliard


Continue Lamictal continue Depakote, hold Ritalin, continue trazodone


Monitor





Left shoulder pain


shoulder and humerus xray ordered: no fracture





Sinus Bradycardia


monitor in Tele


hold Verapamil





LBBB


chronic





Loosening of L4 pedicle screw 


s/p Lumbar Spine Surgery in 2013


Consulted Dr. Pereira





High TSH


 free t4 normal





COPD/Tobacco Use Disorder


- not in exacerbation


- continue Advair





Osteoporosis


- receives Alendronate as outpatient


- continue vitamin D and calcium


- check vitamin D level





Depression/Anxiety/Bipolar


-Management of meds as above





DVT ppx


- subq heparin





Disposition


anticipate transition to Rehab when medically stable


Current Inpatient Medications:





Current Inpatient Medications








 Medications


  (Trade)  Dose


 Ordered  Sig/Kennedy


 Route  Start Time


 Stop Time Status Last Admin


Dose Admin


 


 Ioversol


  (Optiray 320)  111 ml  UD  PRN


 IV  7/27/18 15:00


 7/31/18 14:59   


 


 


 Heparin Sodium


  (Porcine)


  (Heparin Sq 5000


 Unit/0.5ml)  5,000 unit  Q8H


 SQ  7/27/18 22:00


 8/26/18 21:59  7/29/18 13:55


5,000 UNIT


 


 Acetaminophen


  (Tylenol Tab)  650 mg  Q4H  PRN


 PO  7/27/18 16:30


 8/26/18 16:29   


 


 


 Ondansetron HCl


  (Zofran Inj)  4 mg  Q6H  PRN


 IV  7/27/18 16:30


 8/26/18 16:29   


 


 


 Aspirin


  (Ecotrin Tab)  81 mg  1400


 PO  7/28/18 14:00


 8/27/18 13:59  7/30/18 12:16


81 MG


 


 Calcium/Vitamin D


  (Caltrate Plus


 Tab)  2 tab  1400


 PO  7/28/18 14:00


 8/27/18 13:59  7/28/18 13:58


2 TAB


 


 Cyanocobalamin


  (Vitamin B-12


 Tab)  1,000 mcg  QAM


 PO  7/28/18 08:00


 8/27/18 08:59  7/31/18 08:35


1,000 MCG


 


 Divalproex Sodium


  (Depakote Delay


 Rel Tab)  250 mg  HS


 PO  7/27/18 21:00


 8/26/18 20:59  7/30/18 20:54


250 MG


 


 Docusate Sodium


  (coLACE CAP)  100 mg  QPM


 PO  7/27/18 21:00


 8/26/18 20:59  7/30/18 20:54


100 MG


 


 Salmeterol


 Xinafoate/


 Fluticasone


  (Advair Diskus


 250/50 Inh)  1 puff  BID


 INH  7/27/18 20:00


 8/26/18 20:59  7/31/18 08:36


1 PUFF


 


 Lamotrigine


  (Lamictal Tab)  200 mg  BID


 PO  7/27/18 20:00


 8/26/18 20:59  7/31/18 08:35


200 MG


 


 Meclizine HCl


  (Antivert Tab)  25 mg  TID  PRN


 PO  7/27/18 16:30


 8/26/18 16:29   


 


 


 Nitroglycerin


  (Nitrostat Tab)  0.3 mg  PRN  PRN


 UT  7/27/18 16:30


 8/26/18 16:29   


 


 


 Pregabalin


  (Lyrica Cap)  300 mg  BID


 PO  7/27/18 20:00


 8/26/18 20:59  7/31/18 08:40


300 MG


 


 Ranitidine HCl


  (zANTac TAB)  150 mg  BID


 PO  7/27/18 20:00


 8/26/18 20:59  7/31/18 08:35


150 MG


 


 Albuterol


  (Ventolin Hfa


 Inhaler)  2 puffs  Q4H  PRN


 INH  7/27/18 16:30


 8/26/18 16:29   


 


 


 Ketorolac


 Tromethamine


  (Toradol Inj)  15 mg  Q6H  PRN


 IV.  7/27/18 16:30


 8/1/18 16:29  7/31/18 08:47


15 MG


 


 Sodium Chloride  1,000 ml @ 


 75 mls/hr  [id number]


 IV  7/27/18 19:15


 8/26/18 19:14  7/31/18 08:35


75 MLS/HR


 


 Albuterol/


 Ipratropium


  (Duoneb)  3 ml  Q4R  PRN


 INH  7/27/18 17:00


 8/26/18 16:59   


 


 


 Miscellaneous


  (Iv Fluids


 Completed)  1 ea  PRN  PRN


 N/A  7/27/18 17:30


 7/27/19 17:29   


 


 


 Acetaminophen 650


 mg/Empty Bag  65 ml @ 


 260 mls/hr  Q6H


 IV  7/29/18 16:30


 8/28/18 16:29  7/31/18 05:21


260 MLS/HR


 


 Lidocaine


  (Lidoderm Patch


 5%)  1 patch  QAM


 TD  7/30/18 09:00


 8/29/18 08:59  7/31/18 08:36


1 PATCH


 


 Miscellaneous


  (Remove Lidoderm


 Patch)  1 ea  DAILY@21


 N/A  7/29/18 21:00


 8/28/18 20:59  7/30/18 20:55


1 EA


 


 Doxycycline


 Hyclate 100 mg/


 Dextrose  110 ml @ 


 50 mls/hr  Q12H


 IV  7/29/18 14:00


 8/5/18 13:59  7/31/18 02:15


50 MLS/HR


 


 Nicotine


  (Nicoderm Cq


 14MG Patch)  1 patch  QAM


 TD  7/30/18 09:00


 8/29/18 08:59  7/31/18 08:36


1 PATCH


 


 Miscellaneous


  (Remove Nicoderm


 Patch)  1 ea  HS


 N/A  7/30/18 21:00


 8/29/18 20:59  7/30/18 20:55


1 EA


 


 Trazodone HCl


  (Desyrel Tab)  100 mg  HS


 PO  7/31/18 21:00


 8/30/18 20:59 UNV

## 2018-07-31 NOTE — NEUROLOGY PROGRESS NOTES
Neurology Progress Note


Date of Service


Jul 31, 2018.





Subjective


Patient received Haldol overnight because of agitation.  She is much improved/

calmer this morning.


Methylphenidate has been held.


She denies headache, dizziness, or confusion.





CBC shows mild anemia Chem profile has a calcium of 8.2.


Blood pressure is stable and she is afebrile.





Objective











  Date Time  Temp Pulse Resp B/P (MAP) Pulse Ox O2 Delivery O2 Flow Rate FiO2


 


7/31/18 07:28 36.5 63 22 148/75 (99) 93 Nasal Cannula 3.0 


 


7/31/18 03:44 36.4 84 20 149/75 (99) 91 Nasal Cannula 3.5 


 


7/30/18 23:59      Nasal Cannula 4.0 


 


7/30/18 22:50 36.7 66 20 139/67 (91) 92 Nasal Cannula 3.0 





      Humidified Oxygen  


 


7/30/18 19:00 36.3 55 20 128/71 (90) 94 Nasal Cannula 3.5 


 


7/30/18 16:20     94 Nasal Cannula 4.0 


 


7/30/18 15:17 36.7 65 22 152/78 (102) 90 Nasal Cannula 4.0 


 


7/30/18 10:07 36.6 62 19 142/69 (93) 92 Nasal Cannula  











Last 24 Hours








Test


  7/30/18


10:06 7/31/18


06:07


 


Valproic Acid (Depakene) Level 41 mcg/ml  


 


White Blood Count  9.90 K/uL 


 


Red Blood Count  3.77 M/uL 


 


Hemoglobin  11.5 g/dL 


 


Hematocrit  34.2 % 


 


Mean Corpuscular Volume  90.7 fL 


 


Mean Corpuscular Hemoglobin  30.5 pg 


 


Mean Corpuscular Hemoglobin


Concent 


  33.6 g/dl 


 


 


Platelet Count  162 K/uL 


 


Mean Platelet Volume  12.0 fL 


 


Neutrophils (%) (Auto)  85.2 % 


 


Lymphocytes (%) (Auto)  6.8 % 


 


Monocytes (%) (Auto)  7.1 % 


 


Eosinophils (%) (Auto)  0.5 % 


 


Basophils (%) (Auto)  0.2 % 


 


Neutrophils # (Auto)  8.44 K/uL 


 


Lymphocytes # (Auto)  0.67 K/uL 


 


Monocytes # (Auto)  0.70 K/uL 


 


Eosinophils # (Auto)  0.05 K/uL 


 


Basophils # (Auto)  0.02 K/uL 


 


RDW Standard Deviation  45.7 fL 


 


RDW Coefficient of Variation  13.9 % 


 


Immature Granulocyte % (Auto)  0.2 % 


 


Immature Granulocyte # (Auto)  0.02 K/uL 


 


Sodium Level  140 mmol/L 


 


Potassium Level  3.8 mmol/L 


 


Chloride Level  109 mmol/L 


 


Carbon Dioxide Level  20 mmol/L 


 


Anion Gap  11.0 mmol/L 


 


Blood Urea Nitrogen  10 mg/dl 


 


Creatinine  0.63 mg/dl 


 


Est Creatinine Clear Calc


Drug Dose 


  71.8 ml/min 


 


 


Estimated GFR (


American) 


  105.3 


 


 


Estimated GFR (Non-


American 


  90.9 


 


 


BUN/Creatinine Ratio  15.4 


 


Random Glucose  88 mg/dl 


 


Calcium Level  8.2 mg/dl 


 


Magnesium Level  1.8 mg/dl 








Exam:


She is awake and alert.  She is calm and pleasant.  She smiles follows commands 

well and has no obvious aphasia or dysarthria.


Extraocular eye muscles are intact without nystagmus. There is no facial droop.

  Limb strength and coordination is symmetrical.





Current Inpatient Medications








 Medications


  (Trade)  Dose


 Ordered  Sig/Kennedy


 Route  Start Time


 Stop Time Status Last Admin


Dose Admin


 


 Ioversol


  (Optiray 320)  111 ml  UD  PRN


 IV  7/27/18 15:00


 7/31/18 14:59   


 


 


 Heparin Sodium


  (Porcine)


  (Heparin Sq 5000


 Unit/0.5ml)  5,000 unit  Q8H


 SQ  7/27/18 22:00


 8/26/18 21:59  7/29/18 13:55


5,000 UNIT


 


 Acetaminophen


  (Tylenol Tab)  650 mg  Q4H  PRN


 PO  7/27/18 16:30


 8/26/18 16:29   


 


 


 Ondansetron HCl


  (Zofran Inj)  4 mg  Q6H  PRN


 IV  7/27/18 16:30


 8/26/18 16:29   


 


 


 Aspirin


  (Ecotrin Tab)  81 mg  1400


 PO  7/28/18 14:00


 8/27/18 13:59  7/30/18 12:16


81 MG


 


 Calcium/Vitamin D


  (Caltrate Plus


 Tab)  2 tab  1400


 PO  7/28/18 14:00


 8/27/18 13:59  7/28/18 13:58


2 TAB


 


 Cyanocobalamin


  (Vitamin B-12


 Tab)  1,000 mcg  QAM


 PO  7/28/18 08:00


 8/27/18 08:59  7/31/18 08:35


1,000 MCG


 


 Divalproex Sodium


  (Depakote Delay


 Rel Tab)  250 mg  HS


 PO  7/27/18 21:00


 8/26/18 20:59  7/30/18 20:54


250 MG


 


 Docusate Sodium


  (coLACE CAP)  100 mg  QPM


 PO  7/27/18 21:00


 8/26/18 20:59  7/30/18 20:54


100 MG


 


 Salmeterol


 Xinafoate/


 Fluticasone


  (Advair Diskus


 250/50 Inh)  1 puff  BID


 INH  7/27/18 20:00


 8/26/18 20:59  7/31/18 08:36


1 PUFF


 


 Lamotrigine


  (Lamictal Tab)  200 mg  BID


 PO  7/27/18 20:00


 8/26/18 20:59  7/31/18 08:35


200 MG


 


 Meclizine HCl


  (Antivert Tab)  25 mg  TID  PRN


 PO  7/27/18 16:30


 8/26/18 16:29   


 


 


 Nitroglycerin


  (Nitrostat Tab)  0.3 mg  PRN  PRN


 UT  7/27/18 16:30


 8/26/18 16:29   


 


 


 Pregabalin


  (Lyrica Cap)  300 mg  BID


 PO  7/27/18 20:00


 8/26/18 20:59  7/31/18 08:40


300 MG


 


 Ranitidine HCl


  (zANTac TAB)  150 mg  BID


 PO  7/27/18 20:00


 8/26/18 20:59  7/31/18 08:35


150 MG


 


 Albuterol


  (Ventolin Hfa


 Inhaler)  2 puffs  Q4H  PRN


 INH  7/27/18 16:30


 8/26/18 16:29   


 


 


 Ketorolac


 Tromethamine


  (Toradol Inj)  15 mg  Q6H  PRN


 IV.  7/27/18 16:30


 8/1/18 16:29  7/31/18 08:47


15 MG


 


 Sodium Chloride  1,000 ml @ 


 75 mls/hr  [id number]


 IV  7/27/18 19:15


 8/26/18 19:14  7/31/18 08:35


75 MLS/HR


 


 Albuterol/


 Ipratropium


  (Duoneb)  3 ml  Q4R  PRN


 INH  7/27/18 17:00


 8/26/18 16:59   


 


 


 Miscellaneous


  (Iv Fluids


 Completed)  1 ea  PRN  PRN


 N/A  7/27/18 17:30


 7/27/19 17:29   


 


 


 Acetaminophen 650


 mg/Empty Bag  65 ml @ 


 260 mls/hr  Q6H


 IV  7/29/18 16:30


 8/28/18 16:29  7/31/18 05:21


260 MLS/HR


 


 Lidocaine


  (Lidoderm Patch


 5%)  1 patch  QAM


 TD  7/30/18 09:00


 8/29/18 08:59  7/31/18 08:36


1 PATCH


 


 Miscellaneous


  (Remove Lidoderm


 Patch)  1 ea  DAILY@21


 N/A  7/29/18 21:00


 8/28/18 20:59  7/30/18 20:55


1 EA


 


 Doxycycline


 Hyclate 100 mg/


 Dextrose  110 ml @ 


 50 mls/hr  Q12H


 IV  7/29/18 14:00


 8/5/18 13:59  7/31/18 02:15


50 MLS/HR


 


 Nicotine


  (Nicoderm Cq


 14MG Patch)  1 patch  QAM


 TD  7/30/18 09:00


 8/29/18 08:59  7/31/18 08:36


1 PATCH


 


 Miscellaneous


  (Remove Nicoderm


 Patch)  1 ea  HS


 N/A  7/30/18 21:00


 8/29/18 20:59  7/30/18 20:55


1 EA











Impression


1. Gait disturbance





This is multifactorial and is likely predominantly due to her polyneuropathy.  

Polyneuropathy is idiopathic although I suspect the longstanding alcohol abuse 

has contributed.


She has vascular dementia and small vessel ischemia can lead to balance 

problems as well


She has had lumbar spine surgery and has chronic pain.





2.  Vascular dementia, mild, and stable





3. Severe depression and bipolar disorder on multiple medications.





4. History of migraine headaches, improved and stable.





5. History of concussions with falls within the last year and a she seems to 

have recovered from any symptomatology from these.





6. Confusion and lethargy.





This occurred more acutely after admission.  She was having agitation yesterday 

and this improved with house all. 


Today she is much better than yesterday, including alertness and agitation.





I believe her pain medications/narcotics given to her for ribcage pain have 

made her worse.  She has an underlying dementia in any medication like this 

will likely make her more confused





Plan


1. I see no need for additional neurologic testing currently.





2.  Continue physical and occupational therapy.  [facility] 

re[facility] if qualifies.





She may need a 4 wheeled walker instead of a cane for support.





3. Keep off narcotics, as you are doing.





4. Hold methylphenidate for now.





Please contact me if I can be of further assistance on this case.





Dr. Simental will follow the patient as an outpatient.

## 2018-07-31 NOTE — PROGRESS NOTE
Medicine Progress Note


Date & Time of Visit:


Jul 30, 2018 at 09:50.


Subjective


seen resting in bed, not in distress, appears comfortable


has pain when moving


can state full name and address, but otherwise confused- cannot tell where she 

is, why she is in the hospital,


   saying random things


still has pain on the left ribs


on 3 L NC - denies shortness of breath, cough


no other symptoms





Objective





Last 8 Hrs








  Date Time  Temp Pulse Resp B/P (MAP) Pulse Ox O2 Delivery O2 Flow Rate FiO2


 


7/30/18 08:00      Nasal Cannula 3.0 


 


7/30/18 07:00 36.5 65 24 138/67 (90) 90 Nasal Cannula 5.0 


 


7/30/18 03:20 36.4 67 18 128/73 (91) 91 Nasal Cannula 5.0 








Physical Exam:





General- disoriented, not in distress, speaks in sentences 








Neck- supple, no JVD





 Lungs-(+) rales on the left base, no wheezing


(+) tenderness on the posterolateral ribs- left





Heart- regular rhythm; no murmur, normal rate





Abdomen- normal bowel sounds, soft, nontender





Extremities- no pretibial edema, no calf tenderness





Neuro- alert, oriented x 3;no gross focal neuro deficits





Skin- warm & dry


Laboratory Results:





Last 24 Hours








Test


  7/29/18


12:08 7/30/18


06:09


 


White Blood Count 13.04 K/uL  12.81 K/uL 


 


Red Blood Count 4.24 M/uL  4.11 M/uL 


 


Hemoglobin 13.0 g/dL  12.6 g/dL 


 


Hematocrit 39.0 %  37.7 % 


 


Mean Corpuscular Volume 92.0 fL  91.7 fL 


 


Mean Corpuscular Hemoglobin 30.7 pg  30.7 pg 


 


Mean Corpuscular Hemoglobin


Concent 33.3 g/dl 


  33.4 g/dl 


 


 


Platelet Count 173 K/uL  167 K/uL 


 


Mean Platelet Volume 11.8 fL  12.6 fL 


 


Neutrophils (%) (Auto) 87.1 %  88.3 % 


 


Lymphocytes (%) (Auto) 7.0 %  5.0 % 


 


Monocytes (%) (Auto) 5.1 %  6.2 % 


 


Eosinophils (%) (Auto) 0.5 %  0.2 % 


 


Basophils (%) (Auto) 0.1 %  0.1 % 


 


Neutrophils # (Auto) 11.38 K/uL  11.32 K/uL 


 


Lymphocytes # (Auto) 0.91 K/uL  0.64 K/uL 


 


Monocytes # (Auto) 0.66 K/uL  0.79 K/uL 


 


Eosinophils # (Auto) 0.06 K/uL  0.03 K/uL 


 


Basophils # (Auto) 0.01 K/uL  0.01 K/uL 


 


RDW Standard Deviation 46.8 fL  45.7 fL 


 


RDW Coefficient of Variation 13.8 %  13.8 % 


 


Immature Granulocyte % (Auto) 0.2 %  0.2 % 


 


Immature Granulocyte # (Auto) 0.02 K/uL  0.02 K/uL 


 


Sodium Level 141 mmol/L  139 mmol/L 


 


Potassium Level 4.7 mmol/L  3.7 mmol/L 


 


Chloride Level 107 mmol/L  106 mmol/L 


 


Carbon Dioxide Level 28 mmol/L  23 mmol/L 


 


Anion Gap 6.0 mmol/L  10.0 mmol/L 


 


Blood Urea Nitrogen 10 mg/dl  9 mg/dl 


 


Creatinine 0.78 mg/dl  0.72 mg/dl 


 


Est Creatinine Clear Calc


Drug Dose 58.0 ml/min 


  62.8 ml/min 


 


 


Estimated GFR (


American) 89.3 


  98.3 


 


 


Estimated GFR (Non-


American 77.0 


  84.9 


 


 


BUN/Creatinine Ratio 13.1  12.0 


 


Random Glucose 86 mg/dl  85 mg/dl 


 


Calcium Level 8.7 mg/dl  9.0 mg/dl 


 


Magnesium Level 1.9 mg/dl  1.7 mg/dl 











Assessment & Plan


Assessment and Plan


This is a 68 year old female with a past medical history of vertigo, migraines, 

COPD, tobacco use disorder, depression/anxiety, bipolar disorder, hx. of EtOH 

abuse in remission, osteoporosis, HTN, HLD, hypothyroidism, fibromyalgia - 

presents after a fall.








Multiple L Sided Rib Fractures


Mechanical Fall


Hx. of Vertigo


- patient presents with a fall


- she has a hx. of recurrent concussions, hx. of vertigo/migraine symptoms





- pain level about the same


  d/c Morphine and Norco- no opioids due to drowsiness, requiring Narcan


  continue Toradol


  add Ofirmev, Lidoderm patch


 


- O2 supplement at 3L


 denies dyspnea, cough, sputum


  repeat CXR to ff up





- Incentive spirometry encouraged





Metabolic Encephalopathy


likely secondary to Morphine, Norco, Psych Meds


- CT head no acute process


  more awake today but still confused


  continue to monitor off Narcotics





Left shoulder pain


shoulder and humerus xray ordered: no fracture





Sinus Bradycardia


hold Verapamil


HR was in the low 50s , today 64





LBBB


chronic





Loosening of L4 pedicle screw 


s/p Lumbar Spine Surgery in 2013


will consult Dr. Pereira





High TSH


 free t4 normal





COPD/Tobacco Use Disorder


- not in exacerbation


- continue Advair





Osteoporosis


- receives Alendronate as outpatient


- continue vitamin D and calcium


VIT d 32





Depression/Anxiety/Bipolar


- continue home medications





DVT ppx


- subq heparin





Disposition


anticipate transition to Rehab


Current Inpatient Medications:





Current Inpatient Medications








 Medications


  (Trade)  Dose


 Ordered  Sig/Kennedy


 Route  Start Time


 Stop Time Status Last Admin


Dose Admin


 


 Ioversol


  (Optiray 320)  111 ml  UD  PRN


 IV  7/27/18 15:00


 7/31/18 14:59   


 


 


 Heparin Sodium


  (Porcine)


  (Heparin Sq 5000


 Unit/0.5ml)  5,000 unit  Q8H


 SQ  7/27/18 22:00


 8/26/18 21:59  7/29/18 13:55


5,000 UNIT


 


 Acetaminophen


  (Tylenol Tab)  650 mg  Q4H  PRN


 PO  7/27/18 16:30


 8/26/18 16:29   


 


 


 Ondansetron HCl


  (Zofran Inj)  4 mg  Q6H  PRN


 IV  7/27/18 16:30


 8/26/18 16:29   


 


 


 Aspirin


  (Ecotrin Tab)  81 mg  1400


 PO  7/28/18 14:00


 8/27/18 13:59  7/28/18 13:58


81 MG


 


 Calcium/Vitamin D


  (Caltrate Plus


 Tab)  2 tab  1400


 PO  7/28/18 14:00


 8/27/18 13:59  7/28/18 13:58


2 TAB


 


 Cyanocobalamin


  (Vitamin B-12


 Tab)  1,000 mcg  QAM


 PO  7/28/18 08:00


 8/27/18 08:59  7/30/18 07:30


1,000 MCG


 


 Divalproex Sodium


  (Depakote Delay


 Rel Tab)  250 mg  HS


 PO  7/27/18 21:00


 8/26/18 20:59  7/29/18 21:50


250 MG


 


 Docusate Sodium


  (coLACE CAP)  100 mg  QPM


 PO  7/27/18 21:00


 8/26/18 20:59  7/28/18 20:11


100 MG


 


 Salmeterol


 Xinafoate/


 Fluticasone


  (Advair Diskus


 250/50 Inh)  1 puff  BID


 INH  7/27/18 20:00


 8/26/18 20:59  7/30/18 07:30


1 PUFF


 


 Lamotrigine


  (Lamictal Tab)  200 mg  BID


 PO  7/27/18 20:00


 8/26/18 20:59  7/30/18 07:30


200 MG


 


 Meclizine HCl


  (Antivert Tab)  25 mg  TID  PRN


 PO  7/27/18 16:30


 8/26/18 16:29   


 


 


 Methylphenidate


 HCl


  (Ritalin Tab)  10 mg  DAILY@1200


 PO  7/28/18 12:00


 8/11/18 11:59  7/29/18 11:37


10 MG


 


 Nitroglycerin


  (Nitrostat Tab)  0.3 mg  PRN  PRN


 UT  7/27/18 16:30


 8/26/18 16:29   


 


 


 Pregabalin


  (Lyrica Cap)  300 mg  BID


 PO  7/27/18 20:00


 8/26/18 20:59  7/30/18 07:29


300 MG


 


 Ranitidine HCl


  (zANTac TAB)  150 mg  BID


 PO  7/27/18 20:00


 8/26/18 20:59  7/30/18 07:29


150 MG


 


 Trazodone HCl


  (Desyrel Tab)  100 mg  HS


 PO  7/27/18 21:00


 8/26/18 20:59  7/28/18 20:14


100 MG


 


 Albuterol


  (Ventolin Hfa


 Inhaler)  2 puffs  Q4H  PRN


 INH  7/27/18 16:30


 8/26/18 16:29   


 


 


 Methylphenidate


 HCl


  (Ritalin Tab)  20 mg  QAM


 PO  7/28/18 08:00


 8/27/18 08:59  7/30/18 07:29


20 MG


 


 Ketorolac


 Tromethamine


  (Toradol Inj)  15 mg  Q6H  PRN


 IV.  7/27/18 16:30


 8/1/18 16:29  7/30/18 07:29


15 MG


 


 Sodium Chloride  1,000 ml @ 


 75 mls/hr  [id number]


 IV  7/27/18 19:15


 8/26/18 19:14  7/30/18 01:18


75 MLS/HR


 


 Albuterol/


 Ipratropium


  (Duoneb)  3 ml  Q4R  PRN


 INH  7/27/18 17:00


 8/26/18 16:59   


 


 


 Miscellaneous


  (Iv Fluids


 Completed)  1 ea  PRN  PRN


 N/A  7/27/18 17:30


 7/27/19 17:29   


 


 


 Tramadol HCl


  (Ultram Tab)  50 mg  Q6H  PRN


 PO  7/29/18 11:30


 8/28/18 11:29  7/30/18 07:29


50 MG


 


 Acetaminophen 650


 mg/Empty Bag  65 ml @ 


 260 mls/hr  Q6H


 IV  7/29/18 16:30


 8/28/18 16:29  7/30/18 09:49


260 MLS/HR


 


 Lidocaine


  (Lidoderm Patch


 5%)  1 patch  QAM


 TD  7/30/18 09:00


 8/29/18 08:59  7/30/18 07:31


1 PATCH


 


 Miscellaneous


  (Remove Lidoderm


 Patch)  1 ea  DAILY@21


 N/A  7/29/18 21:00


 8/28/18 20:59  7/29/18 21:55


1 EA


 


 Doxycycline


 Hyclate 100 mg/


 Dextrose  110 ml @ 


 50 mls/hr  Q12H


 IV  7/29/18 14:00


 8/5/18 13:59  7/30/18 01:20


50 MLS/HR


 


 Nicotine


  (Nicoderm Cq


 14MG Patch)  1 patch  QAM


 TD  7/30/18 09:00


 8/29/18 08:59  7/30/18 09:48


1 PATCH


 


 Miscellaneous


  (Remove Nicoderm


 Patch)  1 ea  HS


 N/A  7/30/18 21:00


 8/29/18 20:59

## 2018-08-01 VITALS
DIASTOLIC BLOOD PRESSURE: 73 MMHG | HEART RATE: 59 BPM | OXYGEN SATURATION: 95 % | TEMPERATURE: 97.88 F | SYSTOLIC BLOOD PRESSURE: 118 MMHG

## 2018-08-01 VITALS
OXYGEN SATURATION: 90 % | SYSTOLIC BLOOD PRESSURE: 144 MMHG | HEART RATE: 66 BPM | TEMPERATURE: 97.88 F | DIASTOLIC BLOOD PRESSURE: 73 MMHG

## 2018-08-01 VITALS — OXYGEN SATURATION: 90 % | HEART RATE: 66 BPM | SYSTOLIC BLOOD PRESSURE: 157 MMHG | DIASTOLIC BLOOD PRESSURE: 71 MMHG

## 2018-08-01 VITALS — HEART RATE: 66 BPM | OXYGEN SATURATION: 91 %

## 2018-08-01 VITALS
TEMPERATURE: 98.42 F | HEART RATE: 81 BPM | OXYGEN SATURATION: 91 % | DIASTOLIC BLOOD PRESSURE: 63 MMHG | SYSTOLIC BLOOD PRESSURE: 110 MMHG

## 2018-08-01 VITALS
SYSTOLIC BLOOD PRESSURE: 103 MMHG | OXYGEN SATURATION: 91 % | DIASTOLIC BLOOD PRESSURE: 59 MMHG | TEMPERATURE: 97.88 F | HEART RATE: 79 BPM

## 2018-08-01 VITALS — OXYGEN SATURATION: 94 %

## 2018-08-01 VITALS
HEART RATE: 69 BPM | TEMPERATURE: 98.06 F | SYSTOLIC BLOOD PRESSURE: 134 MMHG | OXYGEN SATURATION: 93 % | DIASTOLIC BLOOD PRESSURE: 76 MMHG

## 2018-08-01 VITALS — OXYGEN SATURATION: 9 %

## 2018-08-01 VITALS — HEART RATE: 55 BPM | OXYGEN SATURATION: 95 %

## 2018-08-01 LAB
BASOPHILS # BLD: 0.03 K/UL (ref 0–0.2)
BASOPHILS NFR BLD: 0.4 %
BUN SERPL-MCNC: 10 MG/DL (ref 7–18)
CALCIUM SERPL-MCNC: 8.7 MG/DL (ref 8.5–10.1)
CO2 SERPL-SCNC: 25 MMOL/L (ref 21–32)
CREAT SERPL-MCNC: 0.64 MG/DL (ref 0.6–1.2)
EOS ABS #: 0.18 K/UL (ref 0–0.5)
EOSINOPHIL NFR BLD AUTO: 178 K/UL (ref 130–400)
GLUCOSE SERPL-MCNC: 87 MG/DL (ref 70–99)
HCT VFR BLD CALC: 34.1 % (ref 37–47)
HGB BLD-MCNC: 11.3 G/DL (ref 12–16)
IG#: 0.01 K/UL (ref 0–0.02)
IMM GRANULOCYTES NFR BLD AUTO: 11.9 %
LYMPHOCYTES # BLD: 0.87 K/UL (ref 1.2–3.4)
MCH RBC QN AUTO: 30.1 PG (ref 25–34)
MCHC RBC AUTO-ENTMCNC: 33.1 G/DL (ref 32–36)
MCV RBC AUTO: 90.7 FL (ref 80–100)
MONO ABS #: 0.59 K/UL (ref 0.11–0.59)
MONOCYTES NFR BLD: 8.1 %
NEUT ABS #: 5.64 K/UL (ref 1.4–6.5)
NEUTROPHILS # BLD AUTO: 2.5 %
NEUTROPHILS NFR BLD AUTO: 77 %
PMV BLD AUTO: 11.8 FL (ref 7.4–10.4)
POTASSIUM SERPL-SCNC: 3.4 MMOL/L (ref 3.5–5.1)
RED CELL DISTRIBUTION WIDTH CV: 14.1 % (ref 11.5–14.5)
RED CELL DISTRIBUTION WIDTH SD: 47 FL (ref 36.4–46.3)
SODIUM SERPL-SCNC: 144 MMOL/L (ref 136–145)
WBC # BLD AUTO: 7.32 K/UL (ref 4.8–10.8)

## 2018-08-01 RX ADMIN — KETOROLAC TROMETHAMINE PRN MG: 15 INJECTION INTRAMUSCULAR; INTRAVENOUS at 15:21

## 2018-08-01 RX ADMIN — AMPICILLIN SODIUM AND SULBACTAM SODIUM ONE MLS/HR: 2; 1 INJECTION, POWDER, FOR SOLUTION INTRAMUSCULAR; INTRAVENOUS at 23:55

## 2018-08-01 RX ADMIN — FLUTICASONE PROPIONATE AND SALMETEROL SCH PUFF: 50; 250 POWDER RESPIRATORY (INHALATION) at 20:23

## 2018-08-01 RX ADMIN — Medication SCH MG: at 13:07

## 2018-08-01 RX ADMIN — RANITIDINE SCH MG: 150 TABLET ORAL at 20:23

## 2018-08-01 RX ADMIN — NICOTINE SCH PATCH: 7 PATCH, EXTENDED RELEASE TRANSDERMAL at 07:50

## 2018-08-01 RX ADMIN — IPRATROPIUM BROMIDE SCH MG: 0.5 SOLUTION RESPIRATORY (INHALATION) at 19:57

## 2018-08-01 RX ADMIN — Medication SCH TAB: at 13:07

## 2018-08-01 RX ADMIN — LIDOCAINE SCH PATCH: 50 PATCH CUTANEOUS at 07:43

## 2018-08-01 RX ADMIN — ACETAMINOPHEN SCH MLS/HR: 10 INJECTION, SOLUTION INTRAVENOUS at 04:15

## 2018-08-01 RX ADMIN — DOXYCYCLINE SCH MLS/HR: 100 INJECTION, POWDER, LYOPHILIZED, FOR SOLUTION INTRAVENOUS at 01:32

## 2018-08-01 RX ADMIN — HEPARIN SODIUM SCH UNIT: 10000 INJECTION, SOLUTION INTRAVENOUS; SUBCUTANEOUS at 06:00

## 2018-08-01 RX ADMIN — PREGABALIN SCH MG: 150 CAPSULE ORAL at 07:52

## 2018-08-01 RX ADMIN — DOCUSATE SODIUM SCH MG: 100 CAPSULE, LIQUID FILLED ORAL at 20:23

## 2018-08-01 RX ADMIN — DOXYCYCLINE SCH MLS/HR: 100 INJECTION, POWDER, LYOPHILIZED, FOR SOLUTION INTRAVENOUS at 13:08

## 2018-08-01 RX ADMIN — ACETAMINOPHEN SCH MLS/HR: 10 INJECTION, SOLUTION INTRAVENOUS at 11:56

## 2018-08-01 RX ADMIN — HEPARIN SODIUM SCH UNIT: 10000 INJECTION, SOLUTION INTRAVENOUS; SUBCUTANEOUS at 05:40

## 2018-08-01 RX ADMIN — FLUTICASONE PROPIONATE AND SALMETEROL SCH PUFF: 50; 250 POWDER RESPIRATORY (INHALATION) at 07:43

## 2018-08-01 RX ADMIN — LEVALBUTEROL SCH MG: 1.25 SOLUTION, CONCENTRATE RESPIRATORY (INHALATION) at 19:58

## 2018-08-01 RX ADMIN — DIVALPROEX SODIUM SCH MG: 250 TABLET, DELAYED RELEASE ORAL at 20:24

## 2018-08-01 RX ADMIN — KETOROLAC TROMETHAMINE PRN MG: 15 INJECTION INTRAMUSCULAR; INTRAVENOUS at 02:55

## 2018-08-01 RX ADMIN — ACETAMINOPHEN SCH MLS/HR: 10 INJECTION, SOLUTION INTRAVENOUS at 22:30

## 2018-08-01 RX ADMIN — Medication SCH MCG: at 07:50

## 2018-08-01 RX ADMIN — RANITIDINE SCH MG: 150 TABLET ORAL at 07:51

## 2018-08-01 RX ADMIN — HEPARIN SODIUM SCH UNIT: 10000 INJECTION, SOLUTION INTRAVENOUS; SUBCUTANEOUS at 20:33

## 2018-08-01 RX ADMIN — Medication SCH EA: at 20:33

## 2018-08-01 RX ADMIN — ACETAMINOPHEN SCH MLS/HR: 10 INJECTION, SOLUTION INTRAVENOUS at 16:30

## 2018-08-01 RX ADMIN — HALOPERIDOL LACTATE PRN MG: 5 INJECTION, SOLUTION INTRAMUSCULAR at 23:44

## 2018-08-01 RX ADMIN — HEPARIN SODIUM SCH UNIT: 10000 INJECTION, SOLUTION INTRAVENOUS; SUBCUTANEOUS at 13:12

## 2018-08-01 RX ADMIN — PREGABALIN SCH MG: 150 CAPSULE ORAL at 20:31

## 2018-08-01 RX ADMIN — TRAZODONE HYDROCHLORIDE SCH MG: 100 TABLET ORAL at 20:24

## 2018-08-01 NOTE — PROGRESS NOTE
Internal Med Progress Note


Date of Service:


Aug 1, 2018.


Provider Documentation:





Made aware by RN of increasing agitation/disorientation.  





Patient took off her clothes and O2 mask.





Persistent moist cough symptoms.








AP





Delirium


Multifactorial :


Hospital environment


Medications (neuro psychotropics;  Methylprednisolone for COPD exacerbation)


HAP/possible aspiration pneumonia (charlene aspiration episode noted on swallow 

eval earlier today as per AM notes)





Haldol/Zyprexa as needed


Hold parameters for neuro psychotropics for sedation and confusion


change standard Medrol Dosepak dosing to low dose prednisone course for 

presumptive COPD exacerbation.


IV Unasyn for aspiration pneumonia 





Will relay developments to AM provider.


Vital Signs:











  Date Time  Temp Pulse Resp B/P (MAP) Pulse Ox O2 Delivery O2 Flow Rate FiO2


 


8/2/18 07:02  68 18  93 Nasal Cannula 5.0 


 


8/2/18 03:50 36.7 68 20 137/77 (97) 95 Nasal Cannula 5.0 


 


8/1/18 23:04 36.9 81 18 110/63 (79) 91 Nasal Cannula 5.0 


 


8/1/18 20:30      Nasal Cannula 5.0 


 


8/1/18 19:58  66 18  91 Nasal Cannula 5.0 


 


8/1/18 19:16 36.6 79 24 103/59 (74) 91 Nasal Cannula 5.0 


 


8/1/18 15:35 36.6 66 19 144/73 (96) 90 Nasal Cannula 4.0 


 


8/1/18 10:58 36.7 69 18 134/76 (95) 93  5.0 








Lab Results:





Results Past 24 Hours








Test


  8/2/18


06:41 8/2/18


07:05 8/2/18


07:06 Range/Units


 


 


Sodium Level 145   136-145  mmol/L


 


Potassium Level   3.6 3.5-5.1  mmol/L


 


Chloride Level 111     mmol/L


 


Carbon Dioxide Level 23   21-32  mmol/L


 


Anion Gap 11.0   3-11  mmol/L


 


Blood Urea Nitrogen 13   7-18  mg/dl


 


Creatinine


  0.74


  


  


  0.60-1.20


mg/dl


 


Est Creatinine Clear Calc


Drug Dose 61.1


  


  


   ml/min


 


 


Estimated GFR (


American) 95.1


  


  


   


 


 


Estimated GFR (Non-


American 82.1


  


  


   


 


 


BUN/Creatinine Ratio 18.0   10-20  


 


Random Glucose 105   70-99  mg/dl


 


Calcium Level 8.3   8.5-10.1  mg/dl


 


Magnesium Level   1.9 1.8-2.4  mg/dl


 


White Blood Count  6.96  4.8-10.8  K/uL


 


Red Blood Count  3.59  4.2-5.4  M/uL


 


Hemoglobin  11.0  12.0-16.0  g/dL


 


Hematocrit  32.7  37-47  %


 


Mean Corpuscular Volume  91.1    fL


 


Mean Corpuscular Hemoglobin  30.6  25-34  pg


 


Mean Corpuscular Hemoglobin


Concent 


  33.6


  


  32-36  g/dl


 


 


Platelet Count  239  130-400  K/uL


 


Mean Platelet Volume  11.8  7.4-10.4  fL


 


Neutrophils (%) (Auto)  79.1   %


 


Lymphocytes (%) (Auto)  12.9   %


 


Monocytes (%) (Auto)  7.5   %


 


Eosinophils (%) (Auto)  0.0   %


 


Basophils (%) (Auto)  0.1   %


 


Neutrophils # (Auto)  5.50  1.4-6.5  K/uL


 


Lymphocytes # (Auto)  0.90  1.2-3.4  K/uL


 


Monocytes # (Auto)  0.52  0.11-0.59  K/uL


 


Eosinophils # (Auto)  0.00  0-0.5  K/uL


 


Basophils # (Auto)  0.01  0-0.2  K/uL


 


RDW Standard Deviation  46.5  36.4-46.3  fL


 


RDW Coefficient of Variation  13.9  11.5-14.5  %


 


Immature Granulocyte % (Auto)  0.4   %


 


Immature Granulocyte # (Auto)  0.03  0.00-0.02  K/uL

## 2018-08-01 NOTE — DIAGNOSTIC IMAGING REPORT
SINGLE VIEW CHEST



CLINICAL HISTORY:  Respiratory distress.



FINDINGS: An AP, portable, upright chest radiograph is compared to study dated

7/29/2018 and correlated with chest CT dated 7/27/2018. The examination is

significantly degraded by portable technique and patient rotation.  The

cardiomediastinal heart is mildly enlarged and there is atherosclerotic

calcification of the thoracic aorta. The pulmonary vasculature is noncongested.

Emphysema and chronic interstitial thickening are similar to previous. There is

left basilar consolidation. The right lung appears clear. No large pleural

effusion or pneumothorax is seen. The skeletal structures are osteopenic. The

bony thorax is grossly intact.



IMPRESSION:



1. Cardiomegaly and emphysema.



2. Left basilar consolidation persists. Correlate clinically for evidence of

pneumonia. Radiographic follow-up to resolution is recommended.







Electronically signed by:  Alan Stewart M.D.

8/1/2018 7:34 AM



Dictated Date/Time:  8/1/2018 7:33 AM

## 2018-08-01 NOTE — PROGRESS NOTE
Subjective


Date of Service:


Aug 1, 2018.


Subjective


Pt evaluation today including:  conversation w/ patient, physical exam, lab 

review, review of studies, review of inpatient medication list


Saw/examined the patient in room 240


States that her cough and shortness of breath persists


Denies headaches, dizziness





Problem List


Medical Problems:


(1) Benign hypertension


Status: Chronic  





(2) Bipolar disorder


Status: Chronic  





(3) Depression


Status: Chronic  





(4) Dyslipidemia


Status: Chronic  





(5) Hypothyroidism


Status: Chronic  





(6) Multiple fractures of ribs of left side


Status: Acute  





(7) Osteoporosis


Status: Chronic  





(8) Partial seizure


Status: Chronic  





(9) Recurrent falls


Status: Chronic  





(10) Sigmoid diverticulitis


Status: Chronic  











Review of Systems


Constitutional:  + weakness


Respiratory:  + cough, + sputum, + shortness of breath, No wheezing, No dyspnea 

on exertion, No dyspnea at rest, No hemoptysis


Abdomen:  No pain, No nausea, No vomiting, No diarrhea


Neurologic:  + balance problems, No vertigo





Medications





Current Inpatient Medications








 Medications


  (Trade)  Dose


 Ordered  Sig/Kennedy


 Route  Start Time


 Stop Time Status Last Admin


Dose Admin


 


 Heparin Sodium


  (Porcine)


  (Heparin Sq 5000


 Unit/0.5ml)  5,000 unit  Q8H


 SQ  7/27/18 22:00


 8/26/18 21:59  8/1/18 13:12


5,000 UNIT


 


 Acetaminophen


  (Tylenol Tab)  650 mg  Q4H  PRN


 PO  7/27/18 16:30


 8/26/18 16:29   


 


 


 Ondansetron HCl


  (Zofran Inj)  4 mg  Q6H  PRN


 IV  7/27/18 16:30


 8/26/18 16:29   


 


 


 Aspirin


  (Ecotrin Tab)  81 mg  1400


 PO  7/28/18 14:00


 8/27/18 13:59  7/31/18 13:28


81 MG


 


 Calcium/Vitamin D


  (Caltrate Plus


 Tab)  2 tab  1400


 PO  7/28/18 14:00


 8/27/18 13:59  7/31/18 13:28


2 TAB


 


 Cyanocobalamin


  (Vitamin B-12


 Tab)  1,000 mcg  QAM


 PO  7/28/18 08:00


 8/27/18 08:59  8/1/18 07:50


1,000 MCG


 


 Divalproex Sodium


  (Depakote Delay


 Rel Tab)  250 mg  HS


 PO  7/27/18 21:00


 8/26/18 20:59  7/31/18 19:30


250 MG


 


 Docusate Sodium


  (coLACE CAP)  100 mg  QPM


 PO  7/27/18 21:00


 8/26/18 20:59  7/31/18 19:30


100 MG


 


 Salmeterol


 Xinafoate/


 Fluticasone


  (Advair Diskus


 250/50 Inh)  1 puff  BID


 INH  7/27/18 20:00


 8/26/18 20:59  8/1/18 07:43


1 PUFF


 


 Lamotrigine


  (Lamictal Tab)  200 mg  BID


 PO  7/27/18 20:00


 8/26/18 20:59  8/1/18 07:51


200 MG


 


 Meclizine HCl


  (Antivert Tab)  25 mg  TID  PRN


 PO  7/27/18 16:30


 8/26/18 16:29   


 


 


 Nitroglycerin


  (Nitrostat Tab)  0.3 mg  PRN  PRN


 UT  7/27/18 16:30


 8/26/18 16:29   


 


 


 Pregabalin


  (Lyrica Cap)  300 mg  BID


 PO  7/27/18 20:00


 8/26/18 20:59  8/1/18 07:52


300 MG


 


 Ranitidine HCl


  (zANTac TAB)  150 mg  BID


 PO  7/27/18 20:00


 8/26/18 20:59  8/1/18 07:51


150 MG


 


 Albuterol


  (Ventolin Hfa


 Inhaler)  2 puffs  Q4H  PRN


 INH  7/27/18 16:30


 8/26/18 16:29  7/31/18 19:21


2 PUFFS


 


 Sodium Chloride  1,000 ml @ 


 75 mls/hr  [id number]


 IV  7/27/18 19:15


 8/26/18 19:14 Future Hold 7/31/18 16:11


75 MLS/HR


 


 Albuterol/


 Ipratropium


  (Duoneb)  3 ml  Q4R  PRN


 INH  7/27/18 17:00


 8/26/18 16:59   


 


 


 Miscellaneous


  (Iv Fluids


 Completed)  1 ea  PRN  PRN


 N/A  7/27/18 17:30


 7/27/19 17:29   


 


 


 Acetaminophen 650


 mg/Empty Bag  65 ml @ 


 260 mls/hr  Q6H


 IV  7/29/18 16:30


 8/28/18 16:29  8/1/18 11:56


260 MLS/HR


 


 Lidocaine


  (Lidoderm Patch


 5%)  1 patch  QAM


 TD  7/30/18 09:00


 8/29/18 08:59  8/1/18 07:43


1 PATCH


 


 Miscellaneous


  (Remove Lidoderm


 Patch)  1 ea  DAILY@21


 N/A  7/29/18 21:00


 8/28/18 20:59  7/31/18 19:27


1 EA


 


 Nicotine


  (Nicoderm Cq


 14MG Patch)  1 patch  QAM


 TD  7/30/18 09:00


 8/29/18 08:59  8/1/18 07:50


1 PATCH


 


 Miscellaneous


  (Remove Nicoderm


 Patch)  1 ea  HS


 N/A  7/30/18 21:00


 8/29/18 20:59  7/31/18 19:26


1 EA


 


 Trazodone HCl


  (Desyrel Tab)  100 mg  HS


 PO  7/31/18 21:00


 8/30/18 20:59  7/31/18 19:29


100 MG


 


 Albuterol/


 Ipratropium


  (Duoneb)  3 ml  Q2H  PRN


 INH  8/1/18 05:00


 8/31/18 04:59   


 


 


 Acetaminophen


  (Tylenol Tab)  650 mg  Q4H  PRN


 PO  8/1/18 16:30


 8/31/18 16:29   


 











Objective


Vital Signs











  Date Time  Temp Pulse Resp B/P (MAP) Pulse Ox O2 Delivery O2 Flow Rate FiO2


 


8/1/18 15:35 36.6 66 19 144/73 (96) 90 Nasal Cannula 4.0 


 


8/1/18 10:58 36.7 69 18 134/76 (95) 93  5.0 


 


8/1/18 08:00     94 Nasal Cannula 5.0 


 


8/1/18 06:00 36.6 59 20 118/73 (88) 95 Nasal Cannula 5.0 


 


8/1/18 05:22  55 20  95 Nasal Cannula 5.0 


 


8/1/18 03:01  66 22 157/71 (99) 90 Nasal Cannula 5.0 


 


7/31/18 23:30 36.6 61 20 102/65 (77) 93 Nasal Cannula 5.0 


 


7/31/18 19:32     94 Nasal Cannula 5.0 


 


7/31/18 19:30     94 Nasal Cannula 5.0 


 


7/31/18 19:08 36.7 75 18 162/79 (106) 85   











Physical Exam


General Appearance:  no apparent distress


Respiratory/Chest:  no respiratory distress, no accessory muscle use, + 

decreased breath sounds


Cardiovascular:  regular rate, rhythm, no edema, no murmur


Abdomen:  normal bowel sounds, non tender, soft


Extremities:  normal inspection, no pedal edema


Neurologic/Psychiatric:  no motor/sensory deficits, alert, normal mood/affect





Laboratory Results





Last 24 Hours








Test


  8/1/18


06:40 8/1/18


07:27


 


White Blood Count 7.32 K/uL  


 


Red Blood Count 3.76 M/uL  


 


Hemoglobin 11.3 g/dL  


 


Hematocrit 34.1 %  


 


Mean Corpuscular Volume 90.7 fL  


 


Mean Corpuscular Hemoglobin 30.1 pg  


 


Mean Corpuscular Hemoglobin


Concent 33.1 g/dl 


  


 


 


Platelet Count 178 K/uL  


 


Mean Platelet Volume 11.8 fL  


 


Neutrophils (%) (Auto) 77.0 %  


 


Lymphocytes (%) (Auto) 11.9 %  


 


Monocytes (%) (Auto) 8.1 %  


 


Eosinophils (%) (Auto) 2.5 %  


 


Basophils (%) (Auto) 0.4 %  


 


Neutrophils # (Auto) 5.64 K/uL  


 


Lymphocytes # (Auto) 0.87 K/uL  


 


Monocytes # (Auto) 0.59 K/uL  


 


Eosinophils # (Auto) 0.18 K/uL  


 


Basophils # (Auto) 0.03 K/uL  


 


RDW Standard Deviation 47.0 fL  


 


RDW Coefficient of Variation 14.1 %  


 


Immature Granulocyte % (Auto) 0.1 %  


 


Immature Granulocyte # (Auto) 0.01 K/uL  


 


Sodium Level 144 mmol/L  


 


Potassium Level 3.4 mmol/L  


 


Chloride Level 111 mmol/L  


 


Carbon Dioxide Level 25 mmol/L  


 


Anion Gap 7.0 mmol/L  


 


Blood Urea Nitrogen 10 mg/dl  


 


Creatinine 0.64 mg/dl  


 


Est Creatinine Clear Calc


Drug Dose 70.7 ml/min 


  


 


 


Estimated GFR (


American) 104.8 


  


 


 


Estimated GFR (Non-


American 90.4 


  


 


 


BUN/Creatinine Ratio 15.5  


 


Random Glucose 87 mg/dl  


 


Calcium Level 8.7 mg/dl  


 


Magnesium Level 1.7 mg/dl  


 


Valproic Acid (Depakene) Level 40 mcg/ml  


 


Arterial Blood pH  7.48 


 


Arterial Blood Partial


Pressure CO2 


  33 mmHg 


 


 


Arterial Blood Partial


Pressure O2 


  63 mm/Hg 


 


 


Arterial Blood HCO3  24 mmol/L 


 


Arterial Blood Oxygen


Saturation 


  92.1 % 


 


 


Arterial Blood Base Excess  1.0 mEq/L 


 


Arterial Blood Gas Delivery  5L 


 


Sheng Test  POS 











Assessment and Plan


This is a 68 year old female with a past medical history of vertigo, migraines, 

COPD, tobacco use disorder, depression/anxiety, bipolar disorder, hx. of EtOH 

abuse in remission, osteoporosis, HTN, HLD, hypothyroidism, fibromyalgia - 

presents after a fall.





Mechanical Fall


Hx. of Vertigo


Multiple L Sided Rib Fractures


8/1


- continue PT/OT


- continue with NSAIDs, avoid narcotics


- will likely need placement





7/27


- patient presents with a fall


- she has a hx. of recurrent concussions, hx. of vertigo/migraine symptoms


- follows with neurology


- will give gentle IV hydration


- consult neurology


- continue meclizine PRN for vertigo


- orthostatics


- IV morphine and Toradol PRN for pain


- if no improvement, may need a Fentanyl patch





COPD/Tobacco Use Disorder


8/1


- adding medrol dosepak


- continue nebs PRN


- continue O2 as needed, wean as tolerated


- continue Advair





7/27


- patient requiring O2 due to rib pain


- nebs added as needed


- wean O2 as tolerated


- continue Advair





Osteoporosis


- receives Alendronate as outpatient


- continue vitamin D and calcium


- vitamin D sufficient





Depression/Anxiety/Bipolar


- continue home medications





DVT ppx


- subq heparin





FULL CODE

## 2018-08-02 VITALS
SYSTOLIC BLOOD PRESSURE: 132 MMHG | OXYGEN SATURATION: 93 % | HEART RATE: 68 BPM | DIASTOLIC BLOOD PRESSURE: 56 MMHG | TEMPERATURE: 97.88 F

## 2018-08-02 VITALS
OXYGEN SATURATION: 94 % | SYSTOLIC BLOOD PRESSURE: 122 MMHG | HEART RATE: 55 BPM | TEMPERATURE: 98.24 F | DIASTOLIC BLOOD PRESSURE: 75 MMHG

## 2018-08-02 VITALS
DIASTOLIC BLOOD PRESSURE: 57 MMHG | HEART RATE: 64 BPM | OXYGEN SATURATION: 94 % | SYSTOLIC BLOOD PRESSURE: 134 MMHG | TEMPERATURE: 98.24 F

## 2018-08-02 VITALS — OXYGEN SATURATION: 96 % | HEART RATE: 59 BPM

## 2018-08-02 VITALS
TEMPERATURE: 98.06 F | DIASTOLIC BLOOD PRESSURE: 73 MMHG | OXYGEN SATURATION: 93 % | SYSTOLIC BLOOD PRESSURE: 149 MMHG | HEART RATE: 64 BPM

## 2018-08-02 VITALS — OXYGEN SATURATION: 93 % | HEART RATE: 75 BPM

## 2018-08-02 VITALS — OXYGEN SATURATION: 93 % | HEART RATE: 68 BPM

## 2018-08-02 VITALS
DIASTOLIC BLOOD PRESSURE: 77 MMHG | HEART RATE: 68 BPM | OXYGEN SATURATION: 95 % | TEMPERATURE: 98.06 F | SYSTOLIC BLOOD PRESSURE: 137 MMHG

## 2018-08-02 LAB
BASOPHILS # BLD: 0.01 K/UL (ref 0–0.2)
BASOPHILS NFR BLD: 0.1 %
BUN SERPL-MCNC: 13 MG/DL (ref 7–18)
CALCIUM SERPL-MCNC: 8.3 MG/DL (ref 8.5–10.1)
CO2 SERPL-SCNC: 23 MMOL/L (ref 21–32)
CREAT SERPL-MCNC: 0.74 MG/DL (ref 0.6–1.2)
EOS ABS #: 0 K/UL (ref 0–0.5)
EOSINOPHIL NFR BLD AUTO: 239 K/UL (ref 130–400)
GLUCOSE SERPL-MCNC: 105 MG/DL (ref 70–99)
HCT VFR BLD CALC: 32.7 % (ref 37–47)
HGB BLD-MCNC: 11 G/DL (ref 12–16)
IG#: 0.03 K/UL (ref 0–0.02)
IMM GRANULOCYTES NFR BLD AUTO: 12.9 %
LYMPHOCYTES # BLD: 0.9 K/UL (ref 1.2–3.4)
MCH RBC QN AUTO: 30.6 PG (ref 25–34)
MCHC RBC AUTO-ENTMCNC: 33.6 G/DL (ref 32–36)
MCV RBC AUTO: 91.1 FL (ref 80–100)
MONO ABS #: 0.52 K/UL (ref 0.11–0.59)
MONOCYTES NFR BLD: 7.5 %
NEUT ABS #: 5.5 K/UL (ref 1.4–6.5)
NEUTROPHILS # BLD AUTO: 0 %
NEUTROPHILS NFR BLD AUTO: 79.1 %
PMV BLD AUTO: 11.8 FL (ref 7.4–10.4)
POTASSIUM SERPL-SCNC: 3.6 MMOL/L (ref 3.5–5.1)
RED CELL DISTRIBUTION WIDTH CV: 13.9 % (ref 11.5–14.5)
RED CELL DISTRIBUTION WIDTH SD: 46.5 FL (ref 36.4–46.3)
SODIUM SERPL-SCNC: 145 MMOL/L (ref 136–145)
WBC # BLD AUTO: 6.96 K/UL (ref 4.8–10.8)

## 2018-08-02 RX ADMIN — PREGABALIN SCH MG: 150 CAPSULE ORAL at 20:16

## 2018-08-02 RX ADMIN — HALOPERIDOL PRN MG: 1 TABLET ORAL at 07:30

## 2018-08-02 RX ADMIN — IPRATROPIUM BROMIDE SCH MG: 0.5 SOLUTION RESPIRATORY (INHALATION) at 01:52

## 2018-08-02 RX ADMIN — LIDOCAINE SCH PATCH: 50 PATCH CUTANEOUS at 08:37

## 2018-08-02 RX ADMIN — DIVALPROEX SODIUM SCH MG: 250 TABLET, DELAYED RELEASE ORAL at 20:16

## 2018-08-02 RX ADMIN — IPRATROPIUM BROMIDE SCH MG: 0.5 SOLUTION RESPIRATORY (INHALATION) at 18:56

## 2018-08-02 RX ADMIN — AMPICILLIN SODIUM AND SULBACTAM SODIUM SCH MLS/HR: 2; 1 INJECTION, POWDER, FOR SOLUTION INTRAMUSCULAR; INTRAVENOUS at 05:28

## 2018-08-02 RX ADMIN — HALOPERIDOL LACTATE PRN MG: 5 INJECTION, SOLUTION INTRAMUSCULAR at 22:09

## 2018-08-02 RX ADMIN — LEVALBUTEROL SCH MG: 1.25 SOLUTION, CONCENTRATE RESPIRATORY (INHALATION) at 01:52

## 2018-08-02 RX ADMIN — GUAIFENESIN SCH MG: 600 TABLET, EXTENDED RELEASE ORAL at 20:17

## 2018-08-02 RX ADMIN — Medication SCH MCG: at 08:49

## 2018-08-02 RX ADMIN — LEVALBUTEROL SCH MG: 1.25 SOLUTION, CONCENTRATE RESPIRATORY (INHALATION) at 07:01

## 2018-08-02 RX ADMIN — Medication SCH EA: at 20:17

## 2018-08-02 RX ADMIN — PREGABALIN SCH MG: 150 CAPSULE ORAL at 08:49

## 2018-08-02 RX ADMIN — NICOTINE SCH PATCH: 7 PATCH, EXTENDED RELEASE TRANSDERMAL at 08:44

## 2018-08-02 RX ADMIN — GUAIFENESIN SCH MG: 600 TABLET, EXTENDED RELEASE ORAL at 08:49

## 2018-08-02 RX ADMIN — AMPICILLIN SODIUM AND SULBACTAM SODIUM SCH MLS/HR: 2; 1 INJECTION, POWDER, FOR SOLUTION INTRAMUSCULAR; INTRAVENOUS at 18:20

## 2018-08-02 RX ADMIN — AMPICILLIN SODIUM AND SULBACTAM SODIUM SCH MLS/HR: 2; 1 INJECTION, POWDER, FOR SOLUTION INTRAMUSCULAR; INTRAVENOUS at 12:41

## 2018-08-02 RX ADMIN — AMPICILLIN SODIUM AND SULBACTAM SODIUM ONE MLS/HR: 2; 1 INJECTION, POWDER, FOR SOLUTION INTRAMUSCULAR; INTRAVENOUS at 00:09

## 2018-08-02 RX ADMIN — RANITIDINE SCH MG: 150 TABLET ORAL at 20:17

## 2018-08-02 RX ADMIN — DOCUSATE SODIUM SCH MG: 100 CAPSULE, LIQUID FILLED ORAL at 20:16

## 2018-08-02 RX ADMIN — ACETAMINOPHEN SCH MG: 325 TABLET ORAL at 12:42

## 2018-08-02 RX ADMIN — LEVALBUTEROL SCH MG: 1.25 SOLUTION, CONCENTRATE RESPIRATORY (INHALATION) at 14:20

## 2018-08-02 RX ADMIN — TRAZODONE HYDROCHLORIDE SCH MG: 100 TABLET ORAL at 20:16

## 2018-08-02 RX ADMIN — FLUTICASONE PROPIONATE AND SALMETEROL SCH PUFF: 50; 250 POWDER RESPIRATORY (INHALATION) at 20:17

## 2018-08-02 RX ADMIN — Medication SCH TAB: at 12:41

## 2018-08-02 RX ADMIN — HEPARIN SODIUM SCH UNIT: 10000 INJECTION, SOLUTION INTRAVENOUS; SUBCUTANEOUS at 12:47

## 2018-08-02 RX ADMIN — HEPARIN SODIUM SCH UNIT: 10000 INJECTION, SOLUTION INTRAVENOUS; SUBCUTANEOUS at 05:29

## 2018-08-02 RX ADMIN — HEPARIN SODIUM SCH UNIT: 10000 INJECTION, SOLUTION INTRAVENOUS; SUBCUTANEOUS at 21:39

## 2018-08-02 RX ADMIN — Medication SCH MG: at 12:41

## 2018-08-02 RX ADMIN — ACETAMINOPHEN SCH MG: 325 TABLET ORAL at 21:38

## 2018-08-02 RX ADMIN — IPRATROPIUM BROMIDE SCH MG: 0.5 SOLUTION RESPIRATORY (INHALATION) at 14:20

## 2018-08-02 RX ADMIN — RANITIDINE SCH MG: 150 TABLET ORAL at 08:50

## 2018-08-02 RX ADMIN — ACETAMINOPHEN SCH MG: 325 TABLET ORAL at 05:28

## 2018-08-02 RX ADMIN — IPRATROPIUM BROMIDE SCH MG: 0.5 SOLUTION RESPIRATORY (INHALATION) at 07:01

## 2018-08-02 RX ADMIN — LEVALBUTEROL SCH MG: 1.25 SOLUTION, CONCENTRATE RESPIRATORY (INHALATION) at 18:56

## 2018-08-02 RX ADMIN — FLUTICASONE PROPIONATE AND SALMETEROL SCH PUFF: 50; 250 POWDER RESPIRATORY (INHALATION) at 08:38

## 2018-08-02 NOTE — PROGRESS NOTE
Subjective


Date of Service:


Aug 2, 2018.


Subjective


Pt evaluation today including:  conversation w/ patient, physical exam, lab 

review, review of studies, review of inpatient medication list


Saw/examined the patient in room 240-2


She had some issues overnight, agitation/confusion - sundowning


She is doing slightly better this morning, not as agitated, easily redirected, 

conversing well


has a one-to-one aide at bedside today





Problem List


Medical Problems:


(1) Benign hypertension


Status: Chronic  





(2) Bipolar disorder


Status: Chronic  





(3) Depression


Status: Chronic  





(4) Dyslipidemia


Status: Chronic  





(5) Hypothyroidism


Status: Chronic  





(6) Multiple fractures of ribs of left side


Status: Acute  





(7) Osteoporosis


Status: Chronic  





(8) Partial seizure


Status: Chronic  





(9) Recurrent falls


Status: Chronic  





(10) Sigmoid diverticulitis


Status: Chronic  











Review of Systems


Respiratory:  No shortness of breath


Cardiac:  No chest pain


Psychiatric:  No depression symptoms, No anxiety, No insomnia





Medications





Current Inpatient Medications








 Medications


  (Trade)  Dose


 Ordered  Sig/Kennedy


 Route  Start Time


 Stop Time Status Last Admin


Dose Admin


 


 Heparin Sodium


  (Porcine)


  (Heparin Sq 5000


 Unit/0.5ml)  5,000 unit  Q8H


 SQ  7/27/18 22:00


 8/26/18 21:59  8/2/18 12:47


5,000 UNIT


 


 Ondansetron HCl


  (Zofran Inj)  4 mg  Q6H  PRN


 IV  7/27/18 16:30


 8/26/18 16:29   


 


 


 Aspirin


  (Ecotrin Tab)  81 mg  1400


 PO  7/28/18 14:00


 8/27/18 13:59  8/2/18 12:41


81 MG


 


 Calcium/Vitamin D


  (Caltrate Plus


 Tab)  2 tab  1400


 PO  7/28/18 14:00


 8/27/18 13:59  8/2/18 12:41


2 TAB


 


 Cyanocobalamin


  (Vitamin B-12


 Tab)  1,000 mcg  QAM


 PO  7/28/18 08:00


 8/27/18 08:59  8/1/18 07:50


1,000 MCG


 


 Divalproex Sodium


  (Depakote Delay


 Rel Tab)  250 mg  HS


 PO  7/27/18 21:00


 8/26/18 20:59  8/1/18 20:24


250 MG


 


 Docusate Sodium


  (coLACE CAP)  100 mg  QPM


 PO  7/27/18 21:00


 8/26/18 20:59  8/1/18 20:23


100 MG


 


 Salmeterol


 Xinafoate/


 Fluticasone


  (Advair Diskus


 250/50 Inh)  1 puff  BID


 INH  7/27/18 20:00


 8/26/18 20:59  8/2/18 08:38


1 PUFF


 


 Lamotrigine


  (Lamictal Tab)  200 mg  BID


 PO  7/27/18 20:00


 8/26/18 20:59  8/2/18 08:37


200 MG


 


 Meclizine HCl


  (Antivert Tab)  25 mg  TID  PRN


 PO  7/27/18 16:30


 8/26/18 16:29   


 


 


 Nitroglycerin


  (Nitrostat Tab)  0.3 mg  PRN  PRN


 UT  7/27/18 16:30


 8/26/18 16:29   


 


 


 Ranitidine HCl


  (zANTac TAB)  150 mg  BID


 PO  7/27/18 20:00


 8/26/18 20:59  8/1/18 20:23


150 MG


 


 Albuterol


  (Ventolin Hfa


 Inhaler)  2 puffs  Q4H  PRN


 INH  7/27/18 16:30


 8/26/18 16:29  7/31/18 19:21


2 PUFFS


 


 Sodium Chloride  1,000 ml @ 


 75 mls/hr  [id number]


 IV  7/27/18 19:15


 8/26/18 19:14 Future Hold 7/31/18 16:11


75 MLS/HR


 


 Albuterol/


 Ipratropium


  (Duoneb)  3 ml  Q4R  PRN


 INH  7/27/18 17:00


 8/26/18 16:59   


 


 


 Miscellaneous


  (Iv Fluids


 Completed)  1 ea  PRN  PRN


 N/A  7/27/18 17:30


 7/27/19 17:29   


 


 


 Lidocaine


  (Lidoderm Patch


 5%)  1 patch  QAM


 TD  7/30/18 09:00


 8/29/18 08:59  8/2/18 08:37


1 PATCH


 


 Miscellaneous


  (Remove Lidoderm


 Patch)  1 ea  DAILY@21


 N/A  7/29/18 21:00


 8/28/18 20:59  8/1/18 20:33


1 EA


 


 Nicotine


  (Nicoderm Cq


 14MG Patch)  1 patch  QAM


 TD  7/30/18 09:00


 8/29/18 08:59  8/2/18 08:44


1 PATCH


 


 Miscellaneous


  (Remove Nicoderm


 Patch)  1 ea  HS


 N/A  7/30/18 21:00


 8/29/18 20:59  8/1/18 20:34


1 EA


 


 Trazodone HCl


  (Desyrel Tab)  100 mg  HS


 PO  7/31/18 21:00


 8/30/18 20:59  8/1/18 20:24


100 MG


 


 Albuterol/


 Ipratropium


  (Duoneb)  3 ml  Q2H  PRN


 INH  8/1/18 05:00


 8/31/18 04:59   


 


 


 Acetaminophen


  (Tylenol Tab)  650 mg  Q4H  PRN


 PO  8/1/18 16:30


 8/31/18 16:29   


 


 


 Ipratropium


 Bromide


  (Atrovent 0.02%


 0.5MG/2.5ML Neb)  0.5 mg  Q6R


 INH  8/1/18 21:00


 8/31/18 20:59  8/2/18 14:20


0.5 MG


 


 Levalbuterol


  (Xopenex 1.25MG/


 0.5ML Neb)  1.25 mg  Q6R


 INH  8/1/18 21:00


 8/31/18 20:59  8/2/18 14:20


1.25 MG


 


 Acetaminophen


  (Tylenol Tab)  650 mg  Q8


 PO  8/2/18 06:00


 9/1/18 05:59  8/2/18 12:42


650 MG


 


 Haloperidol


 Lactate


  (Haldol Inj)  2 mg  Q2H  PRN


 IM  8/1/18 23:45


 8/31/18 23:44  8/1/18 23:44


2 MG


 


 Haloperidol


  (Haldol Tab)  2 mg  Q4H  PRN


 PO  8/1/18 23:45


 8/31/18 23:44  8/2/18 07:30


2 MG


 


 Prednisone


  (PredniSONE TAB)  20 mg  DAILY


 PO  8/2/18 09:00


 8/6/18 08:59  8/2/18 08:37


20 MG


 


 Ampicillin Sodium/


 Sulbactam Sodium


  (Consult)  1 ea  UD  PRN


 N/A  8/1/18 23:45


 8/31/18 23:44   


 


 


 Guaifenesin


  (Mucinex Contr


 Rel Tab)  600 mg  Q12


 PO  8/2/18 09:00


 9/1/18 08:59   


 


 


 Ampicillin Sodium/


 Sulbactam Sodium


 3000 mg/Sodium


 Chloride  108 ml @ 


 200 mls/hr  Q6H


 IV  8/2/18 06:00


 8/9/18 05:59  8/2/18 12:41


200 MLS/HR


 


 Pregabalin


  (Lyrica Cap)  300 mg  BID


 PO  8/2/18 09:00


 8/26/18 20:59   


 











Objective


Vital Signs











  Date Time  Temp Pulse Resp B/P (MAP) Pulse Ox O2 Delivery O2 Flow Rate FiO2


 


8/2/18 16:02 36.6 68 21 132/56 (81) 93 Nasal Cannula 4.0 


 


8/2/18 16:00      Nasal Cannula 5.0 


 


8/2/18 14:21  75 18  93 Nasal Cannula 4.0 


 


8/2/18 11:00 36.8 55 20 122/75 (91) 94 Nasal Cannula 5.0 


 


8/2/18 08:00      Nasal Cannula 5.0 


 


8/2/18 07:02  68 18  93 Nasal Cannula 5.0 


 


8/2/18 03:50 36.7 68 20 137/77 (97) 95 Nasal Cannula 5.0 


 


8/1/18 23:04 36.9 81 18 110/63 (79) 91 Nasal Cannula 5.0 


 


8/1/18 20:30      Nasal Cannula 5.0 


 


8/1/18 19:58  66 18  91 Nasal Cannula 5.0 


 


8/1/18 19:16 36.6 79 24 103/59 (74) 91 Nasal Cannula 5.0 











Physical Exam


General Appearance:  no apparent distress


Respiratory/Chest:  chest non-tender, lungs clear, normal breath sounds, no 

respiratory distress, no accessory muscle use


Cardiovascular:  regular rate, rhythm, no edema, no murmur


Neurologic/Psychiatric:  alert, + disoriented





Laboratory Results





Last 24 Hours








Test


  8/2/18


06:41 8/2/18


07:05 8/2/18


07:06 8/2/18


08:30


 


Sodium Level 145 mmol/L    


 


Potassium Level  mmol/L   3.6 mmol/L  


 


Chloride Level 111 mmol/L    


 


Carbon Dioxide Level 23 mmol/L    


 


Anion Gap 11.0 mmol/L    


 


Blood Urea Nitrogen 13 mg/dl    


 


Creatinine 0.74 mg/dl    


 


Est Creatinine Clear Calc


Drug Dose 61.1 ml/min 


  


  


  


 


 


Estimated GFR (


American) 95.1 


  


  


  


 


 


Estimated GFR (Non-


American 82.1 


  


  


  


 


 


BUN/Creatinine Ratio 18.0    


 


Random Glucose 105 mg/dl    


 


Calcium Level 8.3 mg/dl    


 


Magnesium Level  mg/dl   1.9 mg/dl  


 


White Blood Count  6.96 K/uL   


 


Red Blood Count  3.59 M/uL   


 


Hemoglobin  11.0 g/dL   


 


Hematocrit  32.7 %   


 


Mean Corpuscular Volume  91.1 fL   


 


Mean Corpuscular Hemoglobin  30.6 pg   


 


Mean Corpuscular Hemoglobin


Concent 


  33.6 g/dl 


  


  


 


 


Platelet Count  239 K/uL   


 


Mean Platelet Volume  11.8 fL   


 


Neutrophils (%) (Auto)  79.1 %   


 


Lymphocytes (%) (Auto)  12.9 %   


 


Monocytes (%) (Auto)  7.5 %   


 


Eosinophils (%) (Auto)  0.0 %   


 


Basophils (%) (Auto)  0.1 %   


 


Neutrophils # (Auto)  5.50 K/uL   


 


Lymphocytes # (Auto)  0.90 K/uL   


 


Monocytes # (Auto)  0.52 K/uL   


 


Eosinophils # (Auto)  0.00 K/uL   


 


Basophils # (Auto)  0.01 K/uL   


 


RDW Standard Deviation  46.5 fL   


 


RDW Coefficient of Variation  13.9 %   


 


Immature Granulocyte % (Auto)  0.4 %   


 


Immature Granulocyte # (Auto)  0.03 K/uL   


 


Urine Color    DK YELLOW 


 


Urine Appearance    CLEAR 


 


Urine pH    5.5 


 


Urine Specific Gravity    1.037 


 


Urine Protein    NEG 


 


Urine Glucose (UA)    NEG 


 


Urine Ketones    1+ 


 


Urine Occult Blood    NEG 


 


Urine Nitrite    NEG 


 


Urine Bilirubin    NEG 


 


Urine Urobilinogen    NEG 


 


Urine Leukocyte Esterase    NEG 











Assessment and Plan


This is a 68 year old female with a past medical history of vertigo, migraines, 

COPD, tobacco use disorder, depression/anxiety, bipolar disorder, hx. of EtOH 

abuse in remission, osteoporosis, HTN, HLD, hypothyroidism, fibromyalgia - 

presents after a fall.





Mechanical Fall


Hx. of Vertigo


Multiple L Sided Rib Fractures


8/2


- continue PT/OT


- once off of one-to-one observation can d/c to rehab





8/1


- continue PT/OT


- continue with NSAIDs, avoid narcotics


- will likely need placement





7/27


- patient presents with a fall


- she has a hx. of recurrent concussions, hx. of vertigo/migraine symptoms


- follows with neurology


- will give gentle IV hydration


- consult neurology


- continue meclizine PRN for vertigo


- orthostatics


- IV morphine and Toradol PRN for pain


- if no improvement, may need a Fentanyl patch





COPD/Tobacco Use Disorder


8/2


- now on prednisone 20mg daily


- continue Advair


- Unasyn added due to possible aspiration issues


- patient requiring 2L of O2; wean as tolerated





8/1


- adding medrol dosepak


- continue nebs PRN


- continue O2 as needed, wean as tolerated


- continue Advair





7/27


- patient requiring O2 due to rib pain


- nebs added as needed


- wean O2 as tolerated


- continue Advair





Sundowning


- patient with sundowning episodes at night


- does better during the day shift


- required IM Haldol as well as PO Haldol at about 7AM on 8/2


- monitor antipsychotic medication needs, continue with redirection; one-to-one 

for today


- may need psych eval


- would do better in a different environment





Osteoporosis


- receives Alendronate as outpatient


- continue vitamin D and calcium


- vitamin D level is sufficient





Depression/Anxiety/Bipolar


- continue home medications





DVT ppx


- subq heparin





FULL CODE

## 2018-08-03 VITALS
TEMPERATURE: 97.52 F | DIASTOLIC BLOOD PRESSURE: 63 MMHG | OXYGEN SATURATION: 95 % | SYSTOLIC BLOOD PRESSURE: 120 MMHG | HEART RATE: 51 BPM

## 2018-08-03 VITALS — HEART RATE: 52 BPM | OXYGEN SATURATION: 97 %

## 2018-08-03 VITALS — DIASTOLIC BLOOD PRESSURE: 70 MMHG | HEART RATE: 66 BPM | SYSTOLIC BLOOD PRESSURE: 147 MMHG | OXYGEN SATURATION: 91 %

## 2018-08-03 VITALS
HEART RATE: 52 BPM | TEMPERATURE: 99.14 F | SYSTOLIC BLOOD PRESSURE: 134 MMHG | DIASTOLIC BLOOD PRESSURE: 63 MMHG | OXYGEN SATURATION: 97 %

## 2018-08-03 VITALS — OXYGEN SATURATION: 95 % | HEART RATE: 58 BPM

## 2018-08-03 VITALS
TEMPERATURE: 97.88 F | OXYGEN SATURATION: 92 % | SYSTOLIC BLOOD PRESSURE: 158 MMHG | HEART RATE: 81 BPM | DIASTOLIC BLOOD PRESSURE: 70 MMHG

## 2018-08-03 VITALS
TEMPERATURE: 97.88 F | DIASTOLIC BLOOD PRESSURE: 74 MMHG | SYSTOLIC BLOOD PRESSURE: 149 MMHG | OXYGEN SATURATION: 94 % | HEART RATE: 60 BPM

## 2018-08-03 LAB
BASOPHILS # BLD: 0.04 K/UL (ref 0–0.2)
BASOPHILS NFR BLD: 0.5 %
BUN SERPL-MCNC: 17 MG/DL (ref 7–18)
CALCIUM SERPL-MCNC: 8.2 MG/DL (ref 8.5–10.1)
CO2 SERPL-SCNC: 26 MMOL/L (ref 21–32)
CREAT SERPL-MCNC: 0.73 MG/DL (ref 0.6–1.2)
EOS ABS #: 0.05 K/UL (ref 0–0.5)
EOSINOPHIL NFR BLD AUTO: 259 K/UL (ref 130–400)
GLUCOSE SERPL-MCNC: 84 MG/DL (ref 70–99)
HCT VFR BLD CALC: 31.3 % (ref 37–47)
HGB BLD-MCNC: 10.5 G/DL (ref 12–16)
IG#: 0.02 K/UL (ref 0–0.02)
IMM GRANULOCYTES NFR BLD AUTO: 24.9 %
LYMPHOCYTES # BLD: 1.87 K/UL (ref 1.2–3.4)
MCH RBC QN AUTO: 30.6 PG (ref 25–34)
MCHC RBC AUTO-ENTMCNC: 33.5 G/DL (ref 32–36)
MCV RBC AUTO: 91.3 FL (ref 80–100)
MONO ABS #: 1.01 K/UL (ref 0.11–0.59)
MONOCYTES NFR BLD: 13.5 %
NEUT ABS #: 4.51 K/UL (ref 1.4–6.5)
NEUTROPHILS # BLD AUTO: 0.7 %
NEUTROPHILS NFR BLD AUTO: 60.1 %
PMV BLD AUTO: 11.6 FL (ref 7.4–10.4)
POTASSIUM SERPL-SCNC: 3 MMOL/L (ref 3.5–5.1)
RED CELL DISTRIBUTION WIDTH CV: 14.1 % (ref 11.5–14.5)
RED CELL DISTRIBUTION WIDTH SD: 47.5 FL (ref 36.4–46.3)
SODIUM SERPL-SCNC: 143 MMOL/L (ref 136–145)
WBC # BLD AUTO: 7.5 K/UL (ref 4.8–10.8)

## 2018-08-03 RX ADMIN — PREGABALIN SCH MG: 150 CAPSULE ORAL at 22:00

## 2018-08-03 RX ADMIN — LEVALBUTEROL SCH MG: 1.25 SOLUTION, CONCENTRATE RESPIRATORY (INHALATION) at 19:06

## 2018-08-03 RX ADMIN — NICOTINE SCH PATCH: 7 PATCH, EXTENDED RELEASE TRANSDERMAL at 08:18

## 2018-08-03 RX ADMIN — FLUTICASONE PROPIONATE AND SALMETEROL SCH PUFF: 50; 250 POWDER RESPIRATORY (INHALATION) at 22:00

## 2018-08-03 RX ADMIN — HEPARIN SODIUM SCH UNIT: 10000 INJECTION, SOLUTION INTRAVENOUS; SUBCUTANEOUS at 22:03

## 2018-08-03 RX ADMIN — ACETAMINOPHEN SCH MG: 325 TABLET ORAL at 14:33

## 2018-08-03 RX ADMIN — RANITIDINE SCH MG: 150 TABLET ORAL at 08:20

## 2018-08-03 RX ADMIN — IPRATROPIUM BROMIDE SCH MG: 0.5 SOLUTION RESPIRATORY (INHALATION) at 15:00

## 2018-08-03 RX ADMIN — HALOPERIDOL PRN MG: 1 TABLET ORAL at 08:17

## 2018-08-03 RX ADMIN — DEXTROSE MONOHYDRATE, SODIUM CHLORIDE, AND POTASSIUM CHLORIDE SCH MLS/HR: 50; 4.5; 1.49 INJECTION, SOLUTION INTRAVENOUS at 17:35

## 2018-08-03 RX ADMIN — AMPICILLIN SODIUM AND SULBACTAM SODIUM SCH MLS/HR: 2; 1 INJECTION, POWDER, FOR SOLUTION INTRAMUSCULAR; INTRAVENOUS at 12:13

## 2018-08-03 RX ADMIN — POTASSIUM CHLORIDE SCH MLS/HR: 10 INJECTION, SOLUTION INTRAVENOUS at 11:16

## 2018-08-03 RX ADMIN — AMPICILLIN SODIUM AND SULBACTAM SODIUM SCH MLS/HR: 2; 1 INJECTION, POWDER, FOR SOLUTION INTRAMUSCULAR; INTRAVENOUS at 17:35

## 2018-08-03 RX ADMIN — LIDOCAINE SCH PATCH: 50 PATCH CUTANEOUS at 08:18

## 2018-08-03 RX ADMIN — Medication SCH TAB: at 14:34

## 2018-08-03 RX ADMIN — HEPARIN SODIUM SCH UNIT: 10000 INJECTION, SOLUTION INTRAVENOUS; SUBCUTANEOUS at 05:34

## 2018-08-03 RX ADMIN — Medication SCH EA: at 22:00

## 2018-08-03 RX ADMIN — IPRATROPIUM BROMIDE SCH MG: 0.5 SOLUTION RESPIRATORY (INHALATION) at 09:00

## 2018-08-03 RX ADMIN — DIVALPROEX SODIUM SCH MG: 250 TABLET, DELAYED RELEASE ORAL at 22:00

## 2018-08-03 RX ADMIN — FLUTICASONE PROPIONATE AND SALMETEROL SCH PUFF: 50; 250 POWDER RESPIRATORY (INHALATION) at 08:19

## 2018-08-03 RX ADMIN — HALOPERIDOL LACTATE PRN MG: 5 INJECTION, SOLUTION INTRAMUSCULAR at 23:13

## 2018-08-03 RX ADMIN — GUAIFENESIN SCH MG: 600 TABLET, EXTENDED RELEASE ORAL at 08:17

## 2018-08-03 RX ADMIN — ACETAMINOPHEN SCH MG: 325 TABLET ORAL at 05:36

## 2018-08-03 RX ADMIN — AMPICILLIN SODIUM AND SULBACTAM SODIUM SCH MLS/HR: 2; 1 INJECTION, POWDER, FOR SOLUTION INTRAMUSCULAR; INTRAVENOUS at 00:14

## 2018-08-03 RX ADMIN — LEVALBUTEROL SCH MG: 1.25 SOLUTION, CONCENTRATE RESPIRATORY (INHALATION) at 15:00

## 2018-08-03 RX ADMIN — LEVALBUTEROL SCH MG: 1.25 SOLUTION, CONCENTRATE RESPIRATORY (INHALATION) at 09:00

## 2018-08-03 RX ADMIN — AMPICILLIN SODIUM AND SULBACTAM SODIUM SCH MLS/HR: 2; 1 INJECTION, POWDER, FOR SOLUTION INTRAMUSCULAR; INTRAVENOUS at 05:50

## 2018-08-03 RX ADMIN — RANITIDINE SCH MG: 150 TABLET ORAL at 22:01

## 2018-08-03 RX ADMIN — TRAZODONE HYDROCHLORIDE SCH MG: 100 TABLET ORAL at 22:00

## 2018-08-03 RX ADMIN — IPRATROPIUM BROMIDE SCH MG: 0.5 SOLUTION RESPIRATORY (INHALATION) at 02:06

## 2018-08-03 RX ADMIN — PREGABALIN SCH MG: 150 CAPSULE ORAL at 11:16

## 2018-08-03 RX ADMIN — DOCUSATE SODIUM SCH MG: 100 CAPSULE, LIQUID FILLED ORAL at 22:00

## 2018-08-03 RX ADMIN — HEPARIN SODIUM SCH UNIT: 10000 INJECTION, SOLUTION INTRAVENOUS; SUBCUTANEOUS at 14:59

## 2018-08-03 RX ADMIN — POTASSIUM CHLORIDE SCH MLS/HR: 10 INJECTION, SOLUTION INTRAVENOUS at 08:08

## 2018-08-03 RX ADMIN — ACETAMINOPHEN SCH MG: 325 TABLET ORAL at 22:00

## 2018-08-03 RX ADMIN — Medication SCH MG: at 14:34

## 2018-08-03 RX ADMIN — GUAIFENESIN SCH MG: 600 TABLET, EXTENDED RELEASE ORAL at 22:00

## 2018-08-03 RX ADMIN — AMPICILLIN SODIUM AND SULBACTAM SODIUM SCH MLS/HR: 2; 1 INJECTION, POWDER, FOR SOLUTION INTRAMUSCULAR; INTRAVENOUS at 23:15

## 2018-08-03 RX ADMIN — IPRATROPIUM BROMIDE SCH MG: 0.5 SOLUTION RESPIRATORY (INHALATION) at 19:06

## 2018-08-03 RX ADMIN — Medication SCH MCG: at 08:17

## 2018-08-03 RX ADMIN — LEVALBUTEROL SCH MG: 1.25 SOLUTION, CONCENTRATE RESPIRATORY (INHALATION) at 02:06

## 2018-08-03 NOTE — PSYCHIATRIC CONSULTATION
Psychiatric Consultation





Date of Service:  Aug 3, 2018.





Consult requested to evaluate patient with mild dementia, bipolar disorder, who 

has been increasingly agitated, with sundowning.  Liaison attempted to gather 

information, but patient refused to answer questions and threw her out.  I have 

reviewed her chart, and agree with Dr. Hilliard that there are multiple reasons that 

may contribute to the increase in her confusion including meds, falls, change 

in environment, hx alcohol dependence.  In order to avoid agitating her on the 

heels of the liaison's visit, I will postpone seeing her until tomorrow.  I am 

concerned however, that the patient is on both depakote and lamictal, which 

when used together prolongs the half life of lamictal resulting in elevated 

levels.  Elevated lamictal levels can result in gait instability.  I will take 

the liberty of ordering a level before tomorrow's visit, but will not get 

results immediately since its a send out.

## 2018-08-03 NOTE — PROGRESS NOTE
Subjective


Date of Service:


Aug 3, 2018.


Subjective


Pt evaluation today including:  conversation w/ patient, physical exam, lab 

review, review of studies, review of inpatient medication list


Saw/examined the patient in room 240-2


she is more confused this morning; was very agitated last evening and received 

Haldol


confused and disoriented, difficult to redirect


aide at bedside stating that she is not eating well





Problem List


Medical Problems:


(1) Benign hypertension


Status: Chronic  





(2) Bipolar disorder


Status: Chronic  





(3) Depression


Status: Chronic  





(4) Dyslipidemia


Status: Chronic  





(5) Hypothyroidism


Status: Chronic  





(6) Multiple fractures of ribs of left side


Status: Acute  





(7) Osteoporosis


Status: Chronic  





(8) Partial seizure


Status: Chronic  





(9) Recurrent falls


Status: Chronic  





(10) Sigmoid diverticulitis


Status: Chronic  











Medications





Current Inpatient Medications








 Medications


  (Trade)  Dose


 Ordered  Sig/Kennedy


 Route  Start Time


 Stop Time Status Last Admin


Dose Admin


 


 Heparin Sodium


  (Porcine)


  (Heparin Sq 5000


 Unit/0.5ml)  5,000 unit  Q8H


 SQ  7/27/18 22:00


 8/26/18 21:59  8/3/18 14:59


5,000 UNIT


 


 Ondansetron HCl


  (Zofran Inj)  4 mg  Q6H  PRN


 IV  7/27/18 16:30


 8/26/18 16:29   


 


 


 Aspirin


  (Ecotrin Tab)  81 mg  1400


 PO  7/28/18 14:00


 8/27/18 13:59  8/3/18 14:34


81 MG


 


 Calcium/Vitamin D


  (Caltrate Plus


 Tab)  2 tab  1400


 PO  7/28/18 14:00


 8/27/18 13:59  8/3/18 14:34


2 TAB


 


 Cyanocobalamin


  (Vitamin B-12


 Tab)  1,000 mcg  QAM


 PO  7/28/18 08:00


 8/27/18 08:59  8/3/18 08:17


1,000 MCG


 


 Divalproex Sodium


  (Depakote Delay


 Rel Tab)  250 mg  HS


 PO  7/27/18 21:00


 8/26/18 20:59  8/2/18 20:16


250 MG


 


 Docusate Sodium


  (coLACE CAP)  100 mg  QPM


 PO  7/27/18 21:00


 8/26/18 20:59  8/2/18 20:16


100 MG


 


 Salmeterol


 Xinafoate/


 Fluticasone


  (Advair Diskus


 250/50 Inh)  1 puff  BID


 INH  7/27/18 20:00


 8/26/18 20:59  8/3/18 08:19


1 PUFF


 


 Lamotrigine


  (Lamictal Tab)  200 mg  BID


 PO  7/27/18 20:00


 8/26/18 20:59  8/3/18 08:16


200 MG


 


 Meclizine HCl


  (Antivert Tab)  25 mg  TID  PRN


 PO  7/27/18 16:30


 8/26/18 16:29   


 


 


 Nitroglycerin


  (Nitrostat Tab)  0.3 mg  PRN  PRN


 UT  7/27/18 16:30


 8/26/18 16:29   


 


 


 Ranitidine HCl


  (zANTac TAB)  150 mg  BID


 PO  7/27/18 20:00


 8/26/18 20:59  8/3/18 08:20


150 MG


 


 Albuterol


  (Ventolin Hfa


 Inhaler)  2 puffs  Q4H  PRN


 INH  7/27/18 16:30


 8/26/18 16:29  7/31/18 19:21


2 PUFFS


 


 Sodium Chloride  1,000 ml @ 


 75 mls/hr  [id number]


 IV  7/27/18 19:15


 8/26/18 19:14 Future Hold 7/31/18 16:11


75 MLS/HR


 


 Albuterol/


 Ipratropium


  (Duoneb)  3 ml  Q4R  PRN


 INH  7/27/18 17:00


 8/26/18 16:59   


 


 


 Miscellaneous


  (Iv Fluids


 Completed)  1 ea  PRN  PRN


 N/A  7/27/18 17:30


 7/27/19 17:29   


 


 


 Lidocaine


  (Lidoderm Patch


 5%)  1 patch  QAM


 TD  7/30/18 09:00


 8/29/18 08:59  8/3/18 08:18


1 PATCH


 


 Miscellaneous


  (Remove Lidoderm


 Patch)  1 ea  DAILY@21


 N/A  7/29/18 21:00


 8/28/18 20:59  8/2/18 20:17


1 EA


 


 Nicotine


  (Nicoderm Cq


 14MG Patch)  1 patch  QAM


 TD  7/30/18 09:00


 8/29/18 08:59  8/3/18 08:18


1 PATCH


 


 Miscellaneous


  (Remove Nicoderm


 Patch)  1 ea  HS


 N/A  7/30/18 21:00


 8/29/18 20:59  8/2/18 20:17


1 EA


 


 Trazodone HCl


  (Desyrel Tab)  100 mg  HS


 PO  7/31/18 21:00


 8/30/18 20:59  8/2/18 20:16


100 MG


 


 Albuterol/


 Ipratropium


  (Duoneb)  3 ml  Q2H  PRN


 INH  8/1/18 05:00


 8/31/18 04:59   


 


 


 Acetaminophen


  (Tylenol Tab)  650 mg  Q4H  PRN


 PO  8/1/18 16:30


 8/31/18 16:29   


 


 


 Ipratropium


 Bromide


  (Atrovent 0.02%


 0.5MG/2.5ML Neb)  0.5 mg  Q6R


 INH  8/1/18 21:00


 8/31/18 20:59  8/3/18 02:06


0.5 MG


 


 Levalbuterol


  (Xopenex 1.25MG/


 0.5ML Neb)  1.25 mg  Q6R


 INH  8/1/18 21:00


 8/31/18 20:59  8/3/18 02:06


1.25 MG


 


 Acetaminophen


  (Tylenol Tab)  650 mg  Q8


 PO  8/2/18 06:00


 9/1/18 05:59  8/3/18 14:33


650 MG


 


 Haloperidol


 Lactate


  (Haldol Inj)  2 mg  Q2H  PRN


 IM  8/1/18 23:45


 8/31/18 23:44  8/2/18 22:09


2 MG


 


 Haloperidol


  (Haldol Tab)  2 mg  Q4H  PRN


 PO  8/1/18 23:45


 8/31/18 23:44  8/3/18 08:17


2 MG


 


 Prednisone


  (PredniSONE TAB)  20 mg  DAILY


 PO  8/2/18 09:00


 8/6/18 08:59  8/3/18 08:17


20 MG


 


 Ampicillin Sodium/


 Sulbactam Sodium


  (Consult)  1 ea  UD  PRN


 N/A  8/1/18 23:45


 8/31/18 23:44   


 


 


 Guaifenesin


  (Mucinex Contr


 Rel Tab)  600 mg  Q12


 PO  8/2/18 09:00


 9/1/18 08:59  8/3/18 08:17


600 MG


 


 Ampicillin Sodium/


 Sulbactam Sodium


 3000 mg/Sodium


 Chloride  108 ml @ 


 200 mls/hr  Q6H


 IV  8/2/18 06:00


 8/9/18 05:59  8/3/18 12:13


200 MLS/HR


 


 Pregabalin


  (Lyrica Cap)  300 mg  BID


 PO  8/2/18 09:00


 8/26/18 20:59  8/3/18 11:16


300 MG











Objective


Vital Signs











  Date Time  Temp Pulse Resp B/P (MAP) Pulse Ox O2 Delivery O2 Flow Rate FiO2


 


8/3/18 11:10 36.6 60 19 149/74 (99) 94 Nasal Cannula 4.0 





      Humidified Oxygen  


 


8/3/18 08:00      Nasal Cannula 4.0 





      Humidified Oxygen  


 


8/3/18 06:57  66 20 147/70 (95) 91 Nasal Cannula 4.0 





      Humidified Oxygen  


 


8/3/18 04:04 36.6 81 16 158/70 (99) 92 Nasal Cannula 4.0 


 


8/3/18 02:06  58 22  95 Nasal Cannula 4.0 


 


8/2/18 23:38 36.8 64 19 134/57 (82) 94 Nasal Cannula 4.0 


 


8/2/18 20:00      Nasal Cannula 4.0 


 


8/2/18 19:15 36.7 64 20 149/73 (98) 93 Nasal Cannula 4.0 


 


8/2/18 18:56  59 18  96 Nasal Cannula 4.0 


 


8/2/18 16:02 36.6 68 21 132/56 (81) 93 Nasal Cannula 4.0 


 


8/2/18 16:00      Nasal Cannula 5.0 











Physical Exam


General Appearance:  no apparent distress, + pertinent finding (disoriented, 

lethargic/somnolent)


Respiratory/Chest:  + pertinent finding (supplemental oxygen)


Neurologic/Psychiatric:  + depressed affect, + disoriented, + pertinent finding 

(lethargic/somnolent)





Laboratory Results





Last 24 Hours








Test


  8/3/18


06:08 8/3/18


11:46


 


White Blood Count 7.50 K/uL  


 


Red Blood Count 3.43 M/uL  


 


Hemoglobin 10.5 g/dL  


 


Hematocrit 31.3 %  


 


Mean Corpuscular Volume 91.3 fL  


 


Mean Corpuscular Hemoglobin 30.6 pg  


 


Mean Corpuscular Hemoglobin


Concent 33.5 g/dl 


  


 


 


Platelet Count 259 K/uL  


 


Mean Platelet Volume 11.6 fL  


 


Neutrophils (%) (Auto) 60.1 %  


 


Lymphocytes (%) (Auto) 24.9 %  


 


Monocytes (%) (Auto) 13.5 %  


 


Eosinophils (%) (Auto) 0.7 %  


 


Basophils (%) (Auto) 0.5 %  


 


Neutrophils # (Auto) 4.51 K/uL  


 


Lymphocytes # (Auto) 1.87 K/uL  


 


Monocytes # (Auto) 1.01 K/uL  


 


Eosinophils # (Auto) 0.05 K/uL  


 


Basophils # (Auto) 0.04 K/uL  


 


RDW Standard Deviation 47.5 fL  


 


RDW Coefficient of Variation 14.1 %  


 


Immature Granulocyte % (Auto) 0.3 %  


 


Immature Granulocyte # (Auto) 0.02 K/uL  


 


Sodium Level 143 mmol/L  


 


Potassium Level 3.0 mmol/L  


 


Chloride Level 111 mmol/L  


 


Carbon Dioxide Level 26 mmol/L  


 


Anion Gap 6.0 mmol/L  


 


Blood Urea Nitrogen 17 mg/dl  


 


Creatinine 0.73 mg/dl  


 


Est Creatinine Clear Calc


Drug Dose 62.0 ml/min 


  


 


 


Estimated GFR (


American) 96.7 


  


 


 


Estimated GFR (Non-


American 83.4 


  


 


 


BUN/Creatinine Ratio 23.8  


 


Random Glucose 84 mg/dl  


 


Calcium Level 8.2 mg/dl  


 


Magnesium Level 1.9 mg/dl  











Assessment and Plan


This is a 68 year old female with a past medical history of vertigo, migraines, 

COPD, tobacco use disorder, depression/anxiety, bipolar disorder, hx. of EtOH 

abuse in remission, osteoporosis, HTN, HLD, hypothyroidism, fibromyalgia - 

presents after a fall.





Sundowning


8/3


- disorientation, confusion


- requiring intermittent Haldol


- consulting mental health unit/psychiatry


- may need Seroquel or other medications





8/2


- patient with sundowning episodes at night


- does better during the day shift


- required IM Haldol as well as PO Haldol at about 7AM on 8/2


- monitor antipsychotic medication needs, continue with redirection; one-to-one 

for today


- may need psych eval


- would do better in a different environment





Depression/Anxiety/Bipolar


- continue home medications





Mechanical Fall


Hx. of Vertigo


Multiple L Sided Rib Fractures


8/2


- continue PT/OT


- once off of one-to-one observation can d/c to rehab





8/1


- continue PT/OT


- continue with NSAIDs, avoid narcotics


- will likely need placement





7/27


- patient presents with a fall


- she has a hx. of recurrent concussions, hx. of vertigo/migraine symptoms


- follows with neurology


- will give gentle IV hydration


- consult neurology


- continue meclizine PRN for vertigo


- orthostatics


- IV morphine and Toradol PRN for pain


- if no improvement, may need a Fentanyl patch





COPD/Tobacco Use Disorder


8/2


- now on prednisone 20mg daily


- continue Advair


- Unasyn added due to possible aspiration issues


- patient requiring 2L of O2; wean as tolerated





8/1


- adding medrol dosepak


- continue nebs PRN


- continue O2 as needed, wean as tolerated


- continue Advair





7/27


- patient requiring O2 due to rib pain


- nebs added as needed


- wean O2 as tolerated


- continue Advair





Osteoporosis


- receives Alendronate as outpatient


- continue vitamin D and calcium


- vitamin D level is sufficient





DVT ppx


- subq heparin





FULL CODE

## 2018-08-04 VITALS
SYSTOLIC BLOOD PRESSURE: 126 MMHG | DIASTOLIC BLOOD PRESSURE: 76 MMHG | TEMPERATURE: 98.24 F | HEART RATE: 47 BPM | OXYGEN SATURATION: 94 %

## 2018-08-04 VITALS
DIASTOLIC BLOOD PRESSURE: 80 MMHG | OXYGEN SATURATION: 93 % | TEMPERATURE: 97.52 F | SYSTOLIC BLOOD PRESSURE: 152 MMHG | HEART RATE: 50 BPM

## 2018-08-04 VITALS
SYSTOLIC BLOOD PRESSURE: 123 MMHG | DIASTOLIC BLOOD PRESSURE: 72 MMHG | HEART RATE: 45 BPM | TEMPERATURE: 98.6 F | OXYGEN SATURATION: 94 %

## 2018-08-04 VITALS — SYSTOLIC BLOOD PRESSURE: 137 MMHG | HEART RATE: 52 BPM | DIASTOLIC BLOOD PRESSURE: 68 MMHG | OXYGEN SATURATION: 92 %

## 2018-08-04 VITALS
OXYGEN SATURATION: 92 % | SYSTOLIC BLOOD PRESSURE: 150 MMHG | HEART RATE: 53 BPM | TEMPERATURE: 97.7 F | DIASTOLIC BLOOD PRESSURE: 69 MMHG

## 2018-08-04 VITALS — HEART RATE: 76 BPM | OXYGEN SATURATION: 95 %

## 2018-08-04 VITALS — HEART RATE: 77 BPM | OXYGEN SATURATION: 96 %

## 2018-08-04 VITALS
DIASTOLIC BLOOD PRESSURE: 80 MMHG | OXYGEN SATURATION: 93 % | SYSTOLIC BLOOD PRESSURE: 152 MMHG | TEMPERATURE: 97.52 F | HEART RATE: 50 BPM

## 2018-08-04 VITALS
TEMPERATURE: 98.42 F | HEART RATE: 47 BPM | DIASTOLIC BLOOD PRESSURE: 76 MMHG | SYSTOLIC BLOOD PRESSURE: 131 MMHG | OXYGEN SATURATION: 94 %

## 2018-08-04 VITALS — OXYGEN SATURATION: 95 % | HEART RATE: 48 BPM

## 2018-08-04 LAB
BUN SERPL-MCNC: 18 MG/DL (ref 7–18)
CALCIUM SERPL-MCNC: 8.2 MG/DL (ref 8.5–10.1)
CO2 SERPL-SCNC: 25 MMOL/L (ref 21–32)
CREAT SERPL-MCNC: 0.65 MG/DL (ref 0.6–1.2)
EOSINOPHIL NFR BLD AUTO: 265 K/UL (ref 130–400)
GLUCOSE SERPL-MCNC: 85 MG/DL (ref 70–99)
HCT VFR BLD CALC: 31.6 % (ref 37–47)
HGB BLD-MCNC: 10.2 G/DL (ref 12–16)
MCH RBC QN AUTO: 29.7 PG (ref 25–34)
MCHC RBC AUTO-ENTMCNC: 32.3 G/DL (ref 32–36)
MCV RBC AUTO: 92.1 FL (ref 80–100)
PMV BLD AUTO: 11.7 FL (ref 7.4–10.4)
POTASSIUM SERPL-SCNC: 3.4 MMOL/L (ref 3.5–5.1)
RED CELL DISTRIBUTION WIDTH CV: 14.2 % (ref 11.5–14.5)
RED CELL DISTRIBUTION WIDTH SD: 48 FL (ref 36.4–46.3)
SODIUM SERPL-SCNC: 145 MMOL/L (ref 136–145)
WBC # BLD AUTO: 7.97 K/UL (ref 4.8–10.8)

## 2018-08-04 RX ADMIN — HALOPERIDOL PRN MG: 1 TABLET ORAL at 08:16

## 2018-08-04 RX ADMIN — RANITIDINE SCH MG: 150 TABLET ORAL at 21:54

## 2018-08-04 RX ADMIN — LIDOCAINE SCH PATCH: 50 PATCH CUTANEOUS at 08:20

## 2018-08-04 RX ADMIN — DOCUSATE SODIUM SCH MG: 100 CAPSULE, LIQUID FILLED ORAL at 21:52

## 2018-08-04 RX ADMIN — ACETAMINOPHEN PRN MG: 325 TABLET ORAL at 19:23

## 2018-08-04 RX ADMIN — HALOPERIDOL PRN MG: 1 TABLET ORAL at 14:12

## 2018-08-04 RX ADMIN — NICOTINE SCH PATCH: 7 PATCH, EXTENDED RELEASE TRANSDERMAL at 08:20

## 2018-08-04 RX ADMIN — LEVALBUTEROL SCH MG: 1.25 SOLUTION, CONCENTRATE RESPIRATORY (INHALATION) at 07:12

## 2018-08-04 RX ADMIN — ACETAMINOPHEN PRN MG: 325 TABLET ORAL at 08:15

## 2018-08-04 RX ADMIN — LEVALBUTEROL SCH MG: 1.25 SOLUTION, CONCENTRATE RESPIRATORY (INHALATION) at 02:15

## 2018-08-04 RX ADMIN — FLUTICASONE PROPIONATE AND SALMETEROL SCH PUFF: 50; 250 POWDER RESPIRATORY (INHALATION) at 08:13

## 2018-08-04 RX ADMIN — HALOPERIDOL LACTATE PRN MG: 5 INJECTION, SOLUTION INTRAMUSCULAR at 05:01

## 2018-08-04 RX ADMIN — DEXTROSE MONOHYDRATE, SODIUM CHLORIDE, AND POTASSIUM CHLORIDE SCH MLS/HR: 50; 4.5; 1.49 INJECTION, SOLUTION INTRAVENOUS at 08:13

## 2018-08-04 RX ADMIN — Medication SCH MCG: at 08:19

## 2018-08-04 RX ADMIN — PREGABALIN SCH MG: 150 CAPSULE ORAL at 21:52

## 2018-08-04 RX ADMIN — GUAIFENESIN SCH MG: 600 TABLET, EXTENDED RELEASE ORAL at 21:53

## 2018-08-04 RX ADMIN — GUAIFENESIN SCH MG: 600 TABLET, EXTENDED RELEASE ORAL at 08:18

## 2018-08-04 RX ADMIN — AMPICILLIN SODIUM AND SULBACTAM SODIUM SCH MLS/HR: 2; 1 INJECTION, POWDER, FOR SOLUTION INTRAMUSCULAR; INTRAVENOUS at 23:56

## 2018-08-04 RX ADMIN — ACETAMINOPHEN SCH MG: 325 TABLET ORAL at 06:00

## 2018-08-04 RX ADMIN — DEXTROSE MONOHYDRATE, SODIUM CHLORIDE, AND POTASSIUM CHLORIDE SCH MLS/HR: 50; 4.5; 1.49 INJECTION, SOLUTION INTRAVENOUS at 19:28

## 2018-08-04 RX ADMIN — LEVALBUTEROL SCH MG: 1.25 SOLUTION, CONCENTRATE RESPIRATORY (INHALATION) at 19:34

## 2018-08-04 RX ADMIN — Medication SCH MG: at 14:15

## 2018-08-04 RX ADMIN — AMPICILLIN SODIUM AND SULBACTAM SODIUM SCH MLS/HR: 2; 1 INJECTION, POWDER, FOR SOLUTION INTRAMUSCULAR; INTRAVENOUS at 12:00

## 2018-08-04 RX ADMIN — DIVALPROEX SODIUM SCH MG: 250 TABLET, DELAYED RELEASE ORAL at 21:55

## 2018-08-04 RX ADMIN — IPRATROPIUM BROMIDE SCH MG: 0.5 SOLUTION RESPIRATORY (INHALATION) at 07:12

## 2018-08-04 RX ADMIN — AMPICILLIN SODIUM AND SULBACTAM SODIUM SCH MLS/HR: 2; 1 INJECTION, POWDER, FOR SOLUTION INTRAMUSCULAR; INTRAVENOUS at 06:04

## 2018-08-04 RX ADMIN — ACETAMINOPHEN SCH MG: 325 TABLET ORAL at 21:54

## 2018-08-04 RX ADMIN — HEPARIN SODIUM SCH UNIT: 10000 INJECTION, SOLUTION INTRAVENOUS; SUBCUTANEOUS at 21:57

## 2018-08-04 RX ADMIN — HEPARIN SODIUM SCH UNIT: 10000 INJECTION, SOLUTION INTRAVENOUS; SUBCUTANEOUS at 14:16

## 2018-08-04 RX ADMIN — IPRATROPIUM BROMIDE SCH MG: 0.5 SOLUTION RESPIRATORY (INHALATION) at 19:34

## 2018-08-04 RX ADMIN — Medication SCH EA: at 21:51

## 2018-08-04 RX ADMIN — AMPICILLIN SODIUM AND SULBACTAM SODIUM SCH MLS/HR: 2; 1 INJECTION, POWDER, FOR SOLUTION INTRAMUSCULAR; INTRAVENOUS at 18:24

## 2018-08-04 RX ADMIN — RANITIDINE SCH MG: 150 TABLET ORAL at 08:18

## 2018-08-04 RX ADMIN — PREGABALIN SCH MG: 150 CAPSULE ORAL at 08:14

## 2018-08-04 RX ADMIN — HEPARIN SODIUM SCH UNIT: 10000 INJECTION, SOLUTION INTRAVENOUS; SUBCUTANEOUS at 06:00

## 2018-08-04 RX ADMIN — HALOPERIDOL LACTATE PRN MG: 5 INJECTION, SOLUTION INTRAMUSCULAR at 10:47

## 2018-08-04 RX ADMIN — FLUTICASONE PROPIONATE AND SALMETEROL SCH PUFF: 50; 250 POWDER RESPIRATORY (INHALATION) at 21:51

## 2018-08-04 RX ADMIN — Medication SCH TAB: at 14:14

## 2018-08-04 RX ADMIN — IPRATROPIUM BROMIDE SCH MG: 0.5 SOLUTION RESPIRATORY (INHALATION) at 02:15

## 2018-08-04 RX ADMIN — ACETAMINOPHEN SCH MG: 325 TABLET ORAL at 14:14

## 2018-08-04 NOTE — PSYCHIATRIC CONSULTATION
Consultation


Date of Consultation


Aug 4, 2018.





Identifying Data


67 yo female with hx of concussion and migraine, depression and/or bipolar dx 

on top of vascular dementia admitted on 7/27 s/p fall at home where she lives 

alone.  She has a history of vertigo and was seen by neurology in consultation.





Chief Complaint


intermittent agitation





History of Present Illness


patient unable to participate in assessment at this time as she was agitated 

early this am attempting to bite, hit, kick staff and received 2 mg IM Haldol 

and then another PO dose about 1 hour ago.  Per 1-on-1 at bedside patient has 

been restless and c/o pain at times.  Patient was uncooperative with assessment 

yesterday by liaison nurse, scoring 1/5 on MiniCog assessment.  She also 

received IM Zyprexa last pm for similar agitation.  Patient has a history of 

ETOH abuse and is listed as in recovery, VSS and in fact marianela so withdrawal 

unlikely.  She is receiving steroids and a lamictal level was sent as a send 

out.





It appears that Ritalin was held on admission, review of PDMP shows a TANISHA 

prescriber at [facility].  Outpatient care records are not available at this 

time so it is not 100% clear to me which psychoactive medications are for mood 

vs migraine post concussion vs behavioral control in vascular dementia and 

liaison nurse to reach out to daughter for additional collateral re: her 

baseline.  





Review of [facility] appears that patient has been on essentially same psychoactive 

medications several years.  Last ED visit for depression was in 2002.





Past Psychiatric History


Current OP Treatment:  psychiatrist (Kelly Cosme PA-C [facility])


Prior Psych Hospitalizations:  [facility] (?2002)


Past Medication Trials


Celexa and Ativan listed on previous chart





Past Medical/Surgical History


History of Concussion/Seizure:  Yes





(1) Syncope


(2) Multiple fractures of ribs of left side


(3) Bradycardia


(4) Anemia


(5) Concussion





Allergies


Allergies:  


Coded Allergies:  


     Oyster (Verified  Allergy, Mild, N/V, 7/27/18)


     Scallop (Verified  Allergy, Mild, N/V, 7/27/18)


     Sulfamethoxazole w/Trimethoprim (Verified  Adverse Reaction, Mild, N/V, 7/ 27/18)





Home Medications


Scheduled


Alendronate/Cholecalciferol (Fosamax+D 70MG/2800 Iu), 1 TABLET PO WK


Aspirin (Aspirin Ec), 81 MG PO 1400


Calcium/Vitamin D (Os-Janes 500 Plus D), 2 TAB PO 1400


Cyanocobalamin (Vitamin B-12 1000 Mcg), 1,000 MCG PO QAM


Divalproex Sodium (Depakote Delay Rel), 250 MG PO HS


Docusate Sodium (Colace), 1 CAP PO QPM


Fluticasone Prop/Salmeterol (Advair Diskus 250/50 60 Dose), 1 PUFF INH BID


Lamotrigine (Lamictal), 2 TABS PO BID


Methylphenidate (Ritalin), 10 MG PO QPM


Methylphenidate (Ritalin), 20 MG PO QAM


Nitroglycerin (Nitrostat), 0.3 MG UT PRN


Omega 3 Fatty Acids-Lutein-Thuy ([employer]), 1 CAP PO BID


Pregabalin (Lyrica), 300 MG PO BID


Ranitidine (Zantac), 150 MG PO BID


Trazodone Hcl (Trazodone), 100 MG PO HS


Verapamil Hcl (Calan Sr Ext Rel), 180 MG PO QAM


[Imitrex], 1 TAB PO UD





Scheduled PRN


Albuterol Sulfate (Proair Respiclick), 2 PUFFS INH Q4H PRN for SOB/Wheezing


Meclizine Hcl (Meclizine Hcl), 1 TAB PO TID PRN for Dizziness or Vertigo





Family History





Diabetes mellitus


FH: breast cancer


FH: colon cancer


family psych hx unavailable





Alcohol Use


none reported on admit





Smoking Use


Smoking Status:  Current Some Day Smoker





Substance History


denied on admit





Personal History


Lives in:  apartment by self


Relationship History:  


Children:  daughter listed as contact





Review of Systems


patient is unable to complete





Examination


Vital Signs





Vital Signs Past 12 Hours








  Date Time  Temp Pulse Resp B/P (MAP) Pulse Ox O2 Delivery O2 Flow Rate FiO2


 


8/4/18 07:08 37.0 45 18 123/72 (89) 94 Nasal Cannula 2.0 


 


8/4/18 05:41  48 26  95 Nasal Cannula 4.0 


 


8/4/18 02:15  77 20  96 Nasal Cannula 4.0 


 


8/4/18 00:01 36.9 47 18 131/76 (94) 94 Nasal Cannula 4.0 











Laboratory Results





Last 24 Hours








Test


  8/3/18


11:46 8/3/18


20:50 8/4/18


06:22


 


White Blood Count   7.97 K/uL 


 


Red Blood Count   3.43 M/uL 


 


Hemoglobin   10.2 g/dL 


 


Hematocrit   31.6 % 


 


Mean Corpuscular Volume   92.1 fL 


 


Mean Corpuscular Hemoglobin   29.7 pg 


 


Mean Corpuscular Hemoglobin


Concent 


  


  32.3 g/dl 


 


 


RDW Standard Deviation   48.0 fL 


 


RDW Coefficient of Variation   14.2 % 


 


Platelet Count   265 K/uL 


 


Mean Platelet Volume   11.7 fL 


 


Sodium Level   145 mmol/L 


 


Potassium Level   3.4 mmol/L 


 


Chloride Level   112 mmol/L 


 


Carbon Dioxide Level   25 mmol/L 


 


Anion Gap   8.0 mmol/L 


 


Blood Urea Nitrogen   18 mg/dl 


 


Creatinine   0.65 mg/dl 


 


Est Creatinine Clear Calc


Drug Dose 


  


  69.6 ml/min 


 


 


Estimated GFR (


American) 


  


  104.3 


 


 


Estimated GFR (Non-


American 


  


  90.0 


 


 


BUN/Creatinine Ratio   28.0 


 


Random Glucose   85 mg/dl 


 


Calcium Level   8.2 mg/dl 


 


Magnesium Level   1.9 mg/dl 











Mental Examination


During interview pt is:  other (currently sleeping soundly)





Impression / Recommendations


Impression


67 yo female with AMS s/p fall, prior dx of concussion, vascular dementia and 

at least depression if not bipolar disorder.  Unclear indication for Ritalin 

and it was held upon admission.  Patient currently has pain from fall and 

taking steroids, both of which can contribute to agitation/delirium.  Lamictal 

level sent out.  Depakote level subtherapeutic but doses also held due to 

sedation from prns for agitation.





Recommendations


obtain collateral from daughter and records as able to clarify baseline and 

diagnosis


continue 1-on-1, patient continues with AMS (multifactorial as above)


would defer to neuro on dosing of Depakote in dementia patient with hx of post-

concussive syndrome, ?need for ammonia level (currently sedation seems to be 

from prns)





re: use of antipsychotics for acute behavioral control, if on tele low dose 

Haldol 1 mg can also be given IV q4 hrs, monitor swallow, avoid 

anticholinergics if possible


liaison to seek family support for standing order Risperdal M tab if patient 

resistant to PO Haldol





will d/c trazodone as held for sedation and patient with polypharmacy and falls
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
## 2018-08-05 VITALS
TEMPERATURE: 97.34 F | HEART RATE: 66 BPM | DIASTOLIC BLOOD PRESSURE: 68 MMHG | OXYGEN SATURATION: 93 % | SYSTOLIC BLOOD PRESSURE: 130 MMHG

## 2018-08-05 VITALS
TEMPERATURE: 97.88 F | SYSTOLIC BLOOD PRESSURE: 169 MMHG | DIASTOLIC BLOOD PRESSURE: 70 MMHG | HEART RATE: 55 BPM | OXYGEN SATURATION: 94 %

## 2018-08-05 VITALS — OXYGEN SATURATION: 92 % | HEART RATE: 77 BPM

## 2018-08-05 VITALS — HEART RATE: 50 BPM | DIASTOLIC BLOOD PRESSURE: 66 MMHG | SYSTOLIC BLOOD PRESSURE: 159 MMHG

## 2018-08-05 VITALS
HEART RATE: 59 BPM | SYSTOLIC BLOOD PRESSURE: 154 MMHG | TEMPERATURE: 98.24 F | DIASTOLIC BLOOD PRESSURE: 77 MMHG | OXYGEN SATURATION: 90 %

## 2018-08-05 VITALS — OXYGEN SATURATION: 91 % | HEART RATE: 56 BPM

## 2018-08-05 VITALS — OXYGEN SATURATION: 93 % | HEART RATE: 61 BPM

## 2018-08-05 VITALS — HEART RATE: 68 BPM | OXYGEN SATURATION: 92 %

## 2018-08-05 LAB
ALBUMIN SERPL-MCNC: 2.8 GM/DL (ref 3.4–5)
ALP SERPL-CCNC: 97 U/L (ref 45–117)
ALT SERPL-CCNC: 35 U/L (ref 12–78)
AST SERPL-CCNC: 38 U/L (ref 15–37)
BASOPHILS # BLD: 0.04 K/UL (ref 0–0.2)
BASOPHILS NFR BLD: 0.5 %
BUN SERPL-MCNC: 11 MG/DL (ref 7–18)
CALCIUM SERPL-MCNC: 7.9 MG/DL (ref 8.5–10.1)
CO2 SERPL-SCNC: 25 MMOL/L (ref 21–32)
CREAT SERPL-MCNC: 0.72 MG/DL (ref 0.6–1.2)
EOS ABS #: 0.27 K/UL (ref 0–0.5)
EOSINOPHIL NFR BLD AUTO: 295 K/UL (ref 130–400)
GLUCOSE SERPL-MCNC: 87 MG/DL (ref 70–99)
HCT VFR BLD CALC: 32.3 % (ref 37–47)
HGB BLD-MCNC: 10.5 G/DL (ref 12–16)
IG#: 0.21 K/UL (ref 0–0.02)
IMM GRANULOCYTES NFR BLD AUTO: 33.1 %
LIPASE: 59 U/L (ref 73–393)
LYMPHOCYTES # BLD: 2.8 K/UL (ref 1.2–3.4)
MCH RBC QN AUTO: 30.3 PG (ref 25–34)
MCHC RBC AUTO-ENTMCNC: 32.5 G/DL (ref 32–36)
MCV RBC AUTO: 93.1 FL (ref 80–100)
MONO ABS #: 0.76 K/UL (ref 0.11–0.59)
MONOCYTES NFR BLD: 9 %
NEUT ABS #: 4.38 K/UL (ref 1.4–6.5)
NEUTROPHILS # BLD AUTO: 3.2 %
NEUTROPHILS NFR BLD AUTO: 51.7 %
PMV BLD AUTO: 11.5 FL (ref 7.4–10.4)
POTASSIUM SERPL-SCNC: 3.4 MMOL/L (ref 3.5–5.1)
PROT SERPL-MCNC: 6.1 GM/DL (ref 6.4–8.2)
PTT PATIENT: 27.8 SECONDS (ref 21–31)
RED CELL DISTRIBUTION WIDTH CV: 14.3 % (ref 11.5–14.5)
RED CELL DISTRIBUTION WIDTH SD: 48.2 FL (ref 36.4–46.3)
SODIUM SERPL-SCNC: 141 MMOL/L (ref 136–145)
WBC # BLD AUTO: 8.46 K/UL (ref 4.8–10.8)

## 2018-08-05 RX ADMIN — GUAIFENESIN SCH MG: 600 TABLET, EXTENDED RELEASE ORAL at 20:58

## 2018-08-05 RX ADMIN — LEVALBUTEROL SCH MG: 1.25 SOLUTION, CONCENTRATE RESPIRATORY (INHALATION) at 01:52

## 2018-08-05 RX ADMIN — PREGABALIN SCH MG: 150 CAPSULE ORAL at 20:58

## 2018-08-05 RX ADMIN — DOCUSATE SODIUM SCH MG: 100 CAPSULE, LIQUID FILLED ORAL at 20:58

## 2018-08-05 RX ADMIN — HEPARIN SODIUM SCH UNIT: 10000 INJECTION, SOLUTION INTRAVENOUS; SUBCUTANEOUS at 14:04

## 2018-08-05 RX ADMIN — LEVALBUTEROL SCH MG: 1.25 SOLUTION, CONCENTRATE RESPIRATORY (INHALATION) at 19:47

## 2018-08-05 RX ADMIN — DEXTROSE MONOHYDRATE, SODIUM CHLORIDE, AND POTASSIUM CHLORIDE SCH MLS/HR: 50; 4.5; 1.49 INJECTION, SOLUTION INTRAVENOUS at 14:00

## 2018-08-05 RX ADMIN — AMPICILLIN SODIUM AND SULBACTAM SODIUM SCH MLS/HR: 2; 1 INJECTION, POWDER, FOR SOLUTION INTRAMUSCULAR; INTRAVENOUS at 06:00

## 2018-08-05 RX ADMIN — RANITIDINE SCH MG: 150 TABLET ORAL at 08:45

## 2018-08-05 RX ADMIN — Medication SCH MCG: at 08:45

## 2018-08-05 RX ADMIN — FLUTICASONE PROPIONATE AND SALMETEROL SCH PUFF: 50; 250 POWDER RESPIRATORY (INHALATION) at 08:44

## 2018-08-05 RX ADMIN — KETOROLAC TROMETHAMINE PRN MG: 15 INJECTION INTRAMUSCULAR; INTRAVENOUS at 11:52

## 2018-08-05 RX ADMIN — ACETAMINOPHEN SCH MG: 325 TABLET ORAL at 06:01

## 2018-08-05 RX ADMIN — BENZONATATE SCH MG: 100 CAPSULE ORAL at 14:01

## 2018-08-05 RX ADMIN — IPRATROPIUM BROMIDE SCH MG: 0.5 SOLUTION RESPIRATORY (INHALATION) at 07:24

## 2018-08-05 RX ADMIN — PREGABALIN SCH MG: 150 CAPSULE ORAL at 08:52

## 2018-08-05 RX ADMIN — GUAIFENESIN SCH MG: 600 TABLET, EXTENDED RELEASE ORAL at 08:44

## 2018-08-05 RX ADMIN — LEVALBUTEROL SCH MG: 1.25 SOLUTION, CONCENTRATE RESPIRATORY (INHALATION) at 13:45

## 2018-08-05 RX ADMIN — IPRATROPIUM BROMIDE SCH MG: 0.5 SOLUTION RESPIRATORY (INHALATION) at 13:46

## 2018-08-05 RX ADMIN — Medication SCH TAB: at 14:01

## 2018-08-05 RX ADMIN — Medication SCH EA: at 20:59

## 2018-08-05 RX ADMIN — AMPICILLIN SODIUM AND SULBACTAM SODIUM SCH MLS/HR: 2; 1 INJECTION, POWDER, FOR SOLUTION INTRAMUSCULAR; INTRAVENOUS at 11:50

## 2018-08-05 RX ADMIN — KETOROLAC TROMETHAMINE PRN MG: 15 INJECTION INTRAMUSCULAR; INTRAVENOUS at 05:04

## 2018-08-05 RX ADMIN — ACETAMINOPHEN SCH MG: 325 TABLET ORAL at 14:01

## 2018-08-05 RX ADMIN — FLUTICASONE PROPIONATE AND SALMETEROL SCH PUFF: 50; 250 POWDER RESPIRATORY (INHALATION) at 20:59

## 2018-08-05 RX ADMIN — IPRATROPIUM BROMIDE SCH MG: 0.5 SOLUTION RESPIRATORY (INHALATION) at 01:52

## 2018-08-05 RX ADMIN — IPRATROPIUM BROMIDE SCH MG: 0.5 SOLUTION RESPIRATORY (INHALATION) at 19:47

## 2018-08-05 RX ADMIN — Medication SCH MG: at 14:01

## 2018-08-05 RX ADMIN — AMOXICILLIN AND CLAVULANATE POTASSIUM SCH MG: 875; 125 TABLET, FILM COATED ORAL at 16:58

## 2018-08-05 RX ADMIN — HEPARIN SODIUM SCH UNIT: 10000 INJECTION, SOLUTION INTRAVENOUS; SUBCUTANEOUS at 21:05

## 2018-08-05 RX ADMIN — NICOTINE SCH PATCH: 7 PATCH, EXTENDED RELEASE TRANSDERMAL at 08:47

## 2018-08-05 RX ADMIN — LEVALBUTEROL SCH MG: 1.25 SOLUTION, CONCENTRATE RESPIRATORY (INHALATION) at 07:24

## 2018-08-05 RX ADMIN — KETOROLAC TROMETHAMINE PRN MG: 15 INJECTION INTRAMUSCULAR; INTRAVENOUS at 21:08

## 2018-08-05 RX ADMIN — ACETAMINOPHEN SCH MG: 325 TABLET ORAL at 20:58

## 2018-08-05 RX ADMIN — LIDOCAINE SCH PATCH: 50 PATCH CUTANEOUS at 08:47

## 2018-08-05 RX ADMIN — RANITIDINE SCH MG: 150 TABLET ORAL at 20:58

## 2018-08-05 RX ADMIN — HEPARIN SODIUM SCH UNIT: 10000 INJECTION, SOLUTION INTRAVENOUS; SUBCUTANEOUS at 06:02

## 2018-08-05 RX ADMIN — DIVALPROEX SODIUM SCH MG: 250 TABLET, DELAYED RELEASE ORAL at 20:58

## 2018-08-05 RX ADMIN — BENZONATATE SCH MG: 100 CAPSULE ORAL at 20:59

## 2018-08-05 NOTE — PSYCHIATRIC PROGRESS NOTES
Psychiatric Progress Note


Date of Service


Aug 5, 2018.





Notes


chart reviewed, did receive PO Haldol yesterday afternoon around same time that 

c/o chest/rib pain.  More cooperative in pm, last IM yesterday am.  Track us of 

PO Haldol over next 24-48 hours, if continues to require regular doses would 

suggest standing Risperdal M tab 0.5 mg BID temporarily as AMS continues to 

resolve.  Risperdal M tab can be attempted if refusing PO Haldol prior to an IM 

dosing as does seem to cause sedation for patient.
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
## 2018-08-06 VITALS
TEMPERATURE: 97.88 F | HEART RATE: 102 BPM | OXYGEN SATURATION: 91 % | DIASTOLIC BLOOD PRESSURE: 75 MMHG | SYSTOLIC BLOOD PRESSURE: 158 MMHG

## 2018-08-06 VITALS
SYSTOLIC BLOOD PRESSURE: 165 MMHG | HEART RATE: 91 BPM | DIASTOLIC BLOOD PRESSURE: 79 MMHG | OXYGEN SATURATION: 90 % | TEMPERATURE: 97.88 F

## 2018-08-06 VITALS
TEMPERATURE: 98.06 F | SYSTOLIC BLOOD PRESSURE: 117 MMHG | HEART RATE: 69 BPM | DIASTOLIC BLOOD PRESSURE: 67 MMHG | OXYGEN SATURATION: 90 %

## 2018-08-06 VITALS
DIASTOLIC BLOOD PRESSURE: 75 MMHG | TEMPERATURE: 97.88 F | SYSTOLIC BLOOD PRESSURE: 158 MMHG | OXYGEN SATURATION: 93 % | HEART RATE: 71 BPM

## 2018-08-06 VITALS — OXYGEN SATURATION: 90 % | HEART RATE: 90 BPM

## 2018-08-06 VITALS — HEART RATE: 102 BPM | OXYGEN SATURATION: 91 %

## 2018-08-06 VITALS — HEART RATE: 58 BPM | OXYGEN SATURATION: 94 %

## 2018-08-06 LAB
BUN SERPL-MCNC: 10 MG/DL (ref 7–18)
CALCIUM SERPL-MCNC: 8.1 MG/DL (ref 8.5–10.1)
CO2 SERPL-SCNC: 25 MMOL/L (ref 21–32)
CREAT SERPL-MCNC: 0.72 MG/DL (ref 0.6–1.2)
GLUCOSE SERPL-MCNC: 87 MG/DL (ref 70–99)
POTASSIUM SERPL-SCNC: 4.3 MMOL/L (ref 3.5–5.1)
SODIUM SERPL-SCNC: 143 MMOL/L (ref 136–145)

## 2018-08-06 RX ADMIN — BENZONATATE SCH MG: 100 CAPSULE ORAL at 08:10

## 2018-08-06 RX ADMIN — LEVALBUTEROL SCH MG: 1.25 SOLUTION, CONCENTRATE RESPIRATORY (INHALATION) at 01:43

## 2018-08-06 RX ADMIN — IPRATROPIUM BROMIDE SCH MG: 0.5 SOLUTION RESPIRATORY (INHALATION) at 06:58

## 2018-08-06 RX ADMIN — IPRATROPIUM BROMIDE SCH MG: 0.5 SOLUTION RESPIRATORY (INHALATION) at 01:43

## 2018-08-06 RX ADMIN — LEVALBUTEROL SCH MG: 1.25 SOLUTION, CONCENTRATE RESPIRATORY (INHALATION) at 06:58

## 2018-08-06 RX ADMIN — Medication SCH MCG: at 08:11

## 2018-08-06 RX ADMIN — IPRATROPIUM BROMIDE SCH MG: 0.5 SOLUTION RESPIRATORY (INHALATION) at 13:35

## 2018-08-06 RX ADMIN — LIDOCAINE SCH PATCH: 50 PATCH CUTANEOUS at 08:13

## 2018-08-06 RX ADMIN — KETOROLAC TROMETHAMINE PRN MG: 15 INJECTION INTRAMUSCULAR; INTRAVENOUS at 06:05

## 2018-08-06 RX ADMIN — LEVALBUTEROL SCH MG: 1.25 SOLUTION, CONCENTRATE RESPIRATORY (INHALATION) at 13:35

## 2018-08-06 RX ADMIN — Medication SCH TAB: at 14:40

## 2018-08-06 RX ADMIN — RANITIDINE SCH MG: 150 TABLET ORAL at 08:14

## 2018-08-06 RX ADMIN — FLUTICASONE PROPIONATE AND SALMETEROL SCH PUFF: 50; 250 POWDER RESPIRATORY (INHALATION) at 08:15

## 2018-08-06 RX ADMIN — AMOXICILLIN AND CLAVULANATE POTASSIUM SCH MG: 875; 125 TABLET, FILM COATED ORAL at 08:13

## 2018-08-06 RX ADMIN — ACETAMINOPHEN PRN MG: 325 TABLET ORAL at 11:56

## 2018-08-06 RX ADMIN — Medication SCH MG: at 14:11

## 2018-08-06 RX ADMIN — DEXTROSE MONOHYDRATE, SODIUM CHLORIDE, AND POTASSIUM CHLORIDE SCH MLS/HR: 50; 4.5; 1.49 INJECTION, SOLUTION INTRAVENOUS at 09:34

## 2018-08-06 RX ADMIN — HEPARIN SODIUM SCH UNIT: 10000 INJECTION, SOLUTION INTRAVENOUS; SUBCUTANEOUS at 14:00

## 2018-08-06 RX ADMIN — ACETAMINOPHEN SCH MG: 325 TABLET ORAL at 14:09

## 2018-08-06 RX ADMIN — NICOTINE SCH PATCH: 7 PATCH, EXTENDED RELEASE TRANSDERMAL at 08:15

## 2018-08-06 RX ADMIN — PREGABALIN SCH MG: 150 CAPSULE ORAL at 08:17

## 2018-08-06 RX ADMIN — ACETAMINOPHEN SCH MG: 325 TABLET ORAL at 06:06

## 2018-08-06 RX ADMIN — HEPARIN SODIUM SCH UNIT: 10000 INJECTION, SOLUTION INTRAVENOUS; SUBCUTANEOUS at 06:09

## 2018-08-06 RX ADMIN — GUAIFENESIN SCH MG: 600 TABLET, EXTENDED RELEASE ORAL at 08:12

## 2018-08-06 RX ADMIN — BENZONATATE SCH MG: 100 CAPSULE ORAL at 14:09
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
## 2018-08-06 NOTE — DISCHARGE INSTRUCTIONS
Discharge Instructions


Date of Service


Aug 6, 2018.





Admission


Reason for Admission:  Concussion,Multiple Fractures Of Ribs Of Left Side





Discharge


Discharge Diagnosis / Problem:  Mechanical Fall with Left sided  Ribs Fracture,

Acute confusion-resolved





Discharge Goals


Goal(s):  Prevent Disease Progression





Activity Recommendations


Activity Level:  Assistance Required


Therapies:  Physical Therapy, Occupational Therapy





.





Additional Information


Patient informed of condition:  Yes


Advance Directives:  No


DNR:  No


Level of Care:  Skilled


Communicable Disease:  No


Prognosis:  Stable


Oxygen at (LPM):  2 Liters/min via NC as needed


Toscano Catheter:  No





Instructions / Follow-Up


Instructions / Follow-Up


Please make an appointment with your PCP in 1 week





Current Hospital Diet


Patient's current hospital diet: AHA Diet (Heart Healthy)





Discharge Diet


Recommended Diet:  AHA Diet (Heart Healthy)





Pending Studies


Studies pending at discharge:  no





Physician Orders On Transfer


POLST Discussion:  Not Applicable





Medical Emergencies








.


Who to Call and When:





Medical Emergencies:  If at any time you feel your situation is an emergency, 

please call 911 immediately.





.





Non-Emergent Contact


Non-Emergency issues call your:  Primary Care Provider





.





Past History


Medical & Surgical History:  


(1) Multiple fractures of ribs of left side


(2) Anemia


(3) Concussion


(4) Acute confusion


(5) Vascular dementia


(6) Severe depression


(7) Bipolar disorder


.








"Provider Documentation" section prepared by Minerva Rodriguez.








.





Core Measure Problem


Core Measures:  None
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
## 2018-08-16 ENCOUNTER — HOSPITAL ENCOUNTER (OUTPATIENT)
Dept: HOSPITAL 45 - C.RAD | Age: 68
Discharge: HOME | End: 2018-08-16
Attending: PHYSICAL MEDICINE & REHABILITATION
Payer: COMMERCIAL

## 2018-08-16 DIAGNOSIS — Z87.820: ICD-10-CM

## 2018-08-16 DIAGNOSIS — R13.10: Primary | ICD-10-CM

## 2018-08-16 NOTE — DIAGNOSTIC IMAGING REPORT
VIDEO SWALLOW



HISTORY:      DYSPHAGIA



TECHNIQUE: Video fluoroscopic evaluation of swallowing was performed in the AP

and lateral projections by the speech pathology staff. The patient is fed

nectar-thick and thin liquid barium, a barium coated wafer, and barium pudding. 



FLUOROSCOPY TIME: 2.1 minutes. A cine loop was submitted..



COMPARISON STUDY:  None.



FINDINGS: There is normal hyoid excursion and epiglottic deflection. No

significant penetration or aspiration identified. Swallowing function is within

normal limits. Mild esophageal dysmotility.



IMPRESSION:  



1. No aspiration identified.

2. Please see the speech pathologist report for detailed findings and

recommendations.







Electronically signed by:  Doroteo Westfall M.D.

8/16/2018 2:38 PM



Dictated Date/Time:  8/16/2018 2:35 PM

## 2018-08-17 NOTE — SWALLOWING EVALUATION
HISTORY:  This 68 year old woman was referred to [facility] from 
[facility] ([facility]) for a Video Fluoroscopic Swallow 
Study (VFSS) in order to rule out aspiration, and identify the safest consistencies for 
oral intake.  The patient is reporting that she sometimes feels food getting "stuck" in 
her throat and will cough.  PMH is significant for:  Vertigo, frequent falls, multiple 
concussions, fibromyalgia, migraines, Bipolar disorder, anxiety, depression, ETOH abuse.  
She was evaluated by speech therapy services during a recent hospitalization from 
7/30-8/6/18.  She was discharged on a mechanical soft "slippery" diet and was tolerating.  




PROCEDURE:  The patient was seen in the Radiology Department of [facility] for the VFSS.  Cursory examination of the oral cavity revealed the patient to have 
upper and lower dentures of adequate fit.  Movement of the articulators was wnl.    



The patient was positioned upright in a wheelchair for the procedure and was viewed in 
both the Anterior-Posterior (A-P) and Lateral planes.  Volitional phonation exercises 
completed in the A-P plane revealed bilateral vocal fold movement.  Vocal intensity was 
wnl.  



In the lateral plane, the patient was given the following boluses:  1 tsp. thin liquid 
barium x 2, single swallow thin liquid barium self-presented from a cup, serial swallows 
thin liquid barium self-presented via straw, 1 tsp. nectar-thick liquid barium, single 
swallow nectar-thick liquid barium self-presented from a cup, 1 tsp. barium pudding, and 1 
club cracker coated in barium pudding.  



The patient was then repositioned into the A-P plane and given the following boluses:  1 
tsp. nectar thick barium, and 1 tsp. barium pudding.  





RESULTS:  

Oral Stage:  Lip closure was adequate.  The patient was able to maintain a cohesive liquid 
bolus in the oral cavity upon command.  Mastication was mildly slow and prolonged, as was 
lingual motion for bolus transport.  There was a collection of residue along the tongue 
and palate after the initial swallow.  The initiation of the pharyngeal swallow was 
delayed and triggered when the bolus head reached the pyriforms.



Pharyngeal Stage:  Soft palate elevation was complete. Laryngeal elevation revealed 
partial superior movement of the thyroid cartilage and partial approximation of the 
arytenoids to the epiglottic base.  Anterior hyoid excursion was partially reduced.  
Epiglottic deflection was complete. Laryngeal vestibular closure was complete.  The 
pharyngeal stripping wave was present yet diminished.  Pharyngeal contraction was 
complete.  There was partial distention and duration to the opening of the 
pharyngoesophageal segment (PES).  Tongue base retraction was reduced, with a narrow 
column of contrast located between the tongue base and pharyngeal wall during the swallow. 
 There was mild retention along the tongue base, the aryepiglottic folds, valleculae, and 
pyriforms after the swallow.



There was NO aspiration identified for this study.  A majority of the retention clear with 
a second swallow.  The patient did present with one cough reflex during the study, but 
this was not from aspiration.  



Esophageal Stage:  There was mid-distal esophageal retention with retrograde flow below 
the PES.  There was evidence suggestive of an emerging cricopharyngeus impression.  This 
is suggestive of esophageal dysmotility and reflux.



SUMMARY/RECOMMENDATIONS:  The patient presents with mild isabel-pharyngeal dysphagia.  She 
also presents with s/s esophageal dysfunction.  Therefore the following is recommended:  



1.  Regular "SLIPPERY" diet and THIN liquids:  choose foods that are moist, loose, 
slippery; avoid foods that are doughy, thick, dry (e.g., soft breads, thick meat, etc.).  
Add condiments to foods such as sauce or gravy to assist in keeping foods moist.

2.  Aspiration and GERD precautions:  Straws OK.  FULLY UPRIGHT for meals and for 30 
minutes after meals; head of bed at least 30-degrees at all times.

3.  Safe swallow strategies:  Alternate solids and liquids 1:1 while eating.  Rest breaks 
while eating.  Small frequent meals.

4.  Follow up with GI for management as appropriate for esophageal dysfunction 
(medications and/or further testing) as indicated.  

6.  Follow up SLP services at [facility] for carryover of diet and safe swallow 
strategies.  



Both a verbal and written summary of the results and recommendations were provided to the 
patient with verbal understanding.  The patient was provided with verbal and written 
education on a slippery diet for improved comfort while eating.  She verbalized 
understanding and was already familiar with a slippery diet.      



Thank you for referral of this patient.  Please contact me at [phone] if any 
additional information is needed.

## 2018-08-24 NOTE — CODING QUERY NO DIAGNOSIS
:  1950



TREATMENT RENDERED WITHOUT A DIAGNOSIS                                                  



To promote full compliance with coding requirements relating to patient care, physician 
participation is requested in all cases of  uncertainty.  Please assist us with 
providing a diagnosis/symptom for the test(s) below:



A diagnosis/symptom was not documented on your Order.  A valid diagnosis/symptom is 
required to bill all insurances.



**Please remember that we are unable to code a diagnosis of rule out, probable, possible, 
questionable, or suspected.  



Tests that require a diagnosis:

DOS:  16



* VIDEO SWALLOW      DIAGNOSIS:













Provider Signature: _____________________________ Date: _________



Thank you  

Colleen Rivers

Health Information Management

Phone:  [phone]

Fax:  [phone]



Once completed, please kindly fax back to [phone]



For questions please call [phone]